# Patient Record
Sex: FEMALE | Race: WHITE | NOT HISPANIC OR LATINO | Employment: OTHER | URBAN - METROPOLITAN AREA
[De-identification: names, ages, dates, MRNs, and addresses within clinical notes are randomized per-mention and may not be internally consistent; named-entity substitution may affect disease eponyms.]

---

## 2018-01-19 LAB — POC BETA-HCG (QUAL): NEGATIVE

## 2018-05-21 ENCOUNTER — HISTORICAL (OUTPATIENT)
Dept: ADMINISTRATIVE | Facility: HOSPITAL | Age: 23
End: 2018-05-21

## 2018-05-21 LAB
ABS NEUT (OLG): 4.53 X10(3)/MCL (ref 2.1–9.2)
ALBUMIN SERPL-MCNC: 3.8 GM/DL (ref 3.4–5)
ALBUMIN/GLOB SERPL: 1.1 {RATIO}
ALP SERPL-CCNC: 106 UNIT/L (ref 38–126)
ALT SERPL-CCNC: 26 UNIT/L (ref 12–78)
AST SERPL-CCNC: 17 UNIT/L (ref 15–37)
BASOPHILS # BLD AUTO: 0 X10(3)/MCL (ref 0–0.2)
BASOPHILS NFR BLD AUTO: 0 %
BILIRUB SERPL-MCNC: 0.2 MG/DL (ref 0.2–1)
BILIRUBIN DIRECT+TOT PNL SERPL-MCNC: 0.1 MG/DL (ref 0–0.2)
BILIRUBIN DIRECT+TOT PNL SERPL-MCNC: 0.1 MG/DL (ref 0–0.8)
BUN SERPL-MCNC: 9 MG/DL (ref 7–18)
CALCIUM SERPL-MCNC: 8.7 MG/DL (ref 8.5–10.1)
CHLORIDE SERPL-SCNC: 104 MMOL/L (ref 98–107)
CO2 SERPL-SCNC: 25 MMOL/L (ref 21–32)
CREAT SERPL-MCNC: 0.6 MG/DL (ref 0.55–1.02)
EOSINOPHIL # BLD AUTO: 0.2 X10(3)/MCL (ref 0–0.9)
EOSINOPHIL NFR BLD AUTO: 2 %
ERYTHROCYTE [DISTWIDTH] IN BLOOD BY AUTOMATED COUNT: 11.9 % (ref 11.5–17)
GLOBULIN SER-MCNC: 3.4 GM/DL (ref 2.4–3.5)
GLUCOSE SERPL-MCNC: 74 MG/DL (ref 74–106)
HCT VFR BLD AUTO: 40.8 % (ref 37–47)
HGB BLD-MCNC: 13.7 GM/DL (ref 12–16)
LYMPHOCYTES # BLD AUTO: 3.7 X10(3)/MCL (ref 0.6–4.6)
LYMPHOCYTES NFR BLD AUTO: 41 %
MCH RBC QN AUTO: 30 PG (ref 27–31)
MCHC RBC AUTO-ENTMCNC: 33.6 GM/DL (ref 33–36)
MCV RBC AUTO: 89.3 FL (ref 80–94)
MONOCYTES # BLD AUTO: 0.6 X10(3)/MCL (ref 0.1–1.3)
MONOCYTES NFR BLD AUTO: 6 %
NEUTROPHILS # BLD AUTO: 4.53 X10(3)/MCL (ref 2.1–9.2)
NEUTROPHILS NFR BLD AUTO: 50 %
PLATELET # BLD AUTO: 237 X10(3)/MCL (ref 130–400)
PMV BLD AUTO: 9.4 FL (ref 9.4–12.4)
POTASSIUM SERPL-SCNC: 3.8 MMOL/L (ref 3.5–5.1)
PROT SERPL-MCNC: 7.2 GM/DL (ref 6.4–8.2)
RBC # BLD AUTO: 4.57 X10(6)/MCL (ref 4.2–5.4)
SODIUM SERPL-SCNC: 140 MMOL/L (ref 136–145)
TSH SERPL-ACNC: 1.29 MIU/L (ref 0.36–3.74)
WBC # SPEC AUTO: 9.1 X10(3)/MCL (ref 4.5–11.5)

## 2018-12-14 ENCOUNTER — HISTORICAL (OUTPATIENT)
Dept: ADMINISTRATIVE | Facility: HOSPITAL | Age: 23
End: 2018-12-14

## 2018-12-14 LAB
ABS NEUT (OLG): 7.44 X10(3)/MCL (ref 2.1–9.2)
ALBUMIN SERPL-MCNC: 3.5 GM/DL (ref 3.4–5)
ALBUMIN/GLOB SERPL: 1.1 {RATIO}
ALP SERPL-CCNC: 91 UNIT/L (ref 38–126)
ALT SERPL-CCNC: 23 UNIT/L (ref 12–78)
AMPHET UR QL SCN: NORMAL
APPEARANCE, UA: CLEAR
AST SERPL-CCNC: 14 UNIT/L (ref 15–37)
B-HCG SERPL QL: NEGATIVE
BACTERIA SPEC CULT: ABNORMAL /HPF
BARBITURATE SCN PRESENT UR: NORMAL
BASOPHILS # BLD AUTO: 0 X10(3)/MCL (ref 0–0.2)
BASOPHILS NFR BLD AUTO: 0 %
BENZODIAZ UR QL SCN: NORMAL
BILIRUB SERPL-MCNC: 0.2 MG/DL (ref 0.2–1)
BILIRUB UR QL STRIP: NEGATIVE
BILIRUBIN DIRECT+TOT PNL SERPL-MCNC: 0.1 MG/DL (ref 0–0.2)
BILIRUBIN DIRECT+TOT PNL SERPL-MCNC: 0.1 MG/DL (ref 0–0.8)
BUN SERPL-MCNC: 9 MG/DL (ref 7–18)
CALCIUM SERPL-MCNC: 8.4 MG/DL (ref 8.5–10.1)
CANNABINOIDS UR QL SCN: NORMAL
CHLORIDE SERPL-SCNC: 110 MMOL/L (ref 98–107)
CO2 SERPL-SCNC: 22 MMOL/L (ref 21–32)
COCAINE UR QL SCN: NORMAL
COLOR UR: YELLOW
CREAT SERPL-MCNC: 0.6 MG/DL (ref 0.55–1.02)
EOSINOPHIL # BLD AUTO: 0.2 X10(3)/MCL (ref 0–0.9)
EOSINOPHIL NFR BLD AUTO: 1 %
EOSINOPHIL NFR BLD MANUAL: 1 % (ref 0–8)
ERYTHROCYTE [DISTWIDTH] IN BLOOD BY AUTOMATED COUNT: 12.2 % (ref 11.5–17)
ETHANOL SERPL-MCNC: <3 MG/DL
GLOBULIN SER-MCNC: 3.2 GM/DL (ref 2.4–3.5)
GLUCOSE (UA): NEGATIVE
GLUCOSE SERPL-MCNC: 99 MG/DL (ref 74–106)
HCT VFR BLD AUTO: 36.9 % (ref 37–47)
HGB BLD-MCNC: 12.3 GM/DL (ref 12–16)
HGB UR QL STRIP: NEGATIVE
HYPOCHROMIA BLD QL SMEAR: 1
KETONES UR QL STRIP: NEGATIVE
LEUKOCYTE ESTERASE UR QL STRIP: ABNORMAL
LYMPHOCYTES # BLD AUTO: 6.1 X10(3)/MCL (ref 0.6–4.6)
LYMPHOCYTES NFR BLD AUTO: 42 %
LYMPHOCYTES NFR BLD MANUAL: 37 % (ref 13–40)
MAGNESIUM SERPL-MCNC: 2 MG/DL (ref 1.8–2.4)
MCH RBC QN AUTO: 29.6 PG (ref 27–31)
MCHC RBC AUTO-ENTMCNC: 33.3 GM/DL (ref 33–36)
MCV RBC AUTO: 88.7 FL (ref 80–94)
MONOCYTES # BLD AUTO: 0.7 X10(3)/MCL (ref 0.1–1.3)
MONOCYTES NFR BLD AUTO: 5 %
MONOCYTES NFR BLD MANUAL: 5 % (ref 2–11)
NEUTROPHILS # BLD AUTO: 7.44 X10(3)/MCL (ref 2.1–9.2)
NEUTROPHILS NFR BLD AUTO: 51 %
NEUTROPHILS NFR BLD MANUAL: 57 % (ref 47–80)
NITRITE UR QL STRIP: NEGATIVE
OPIATES UR QL SCN: NORMAL
PCP UR QL: NORMAL
PH UR STRIP.AUTO: 7 [PH] (ref 5–7.5)
PH UR STRIP: 7 [PH] (ref 5–9)
PLATELET # BLD AUTO: 242 X10(3)/MCL (ref 130–400)
PLATELET # BLD EST: NORMAL 10*3/UL
PMV BLD AUTO: 9.2 FL (ref 9.4–12.4)
POC BETA-HCG (QUAL): NEGATIVE
POLYCHROMASIA BLD QL SMEAR: 1
POTASSIUM SERPL-SCNC: 3.2 MMOL/L (ref 3.5–5.1)
PROT SERPL-MCNC: 6.7 GM/DL (ref 6.4–8.2)
PROT UR QL STRIP: NEGATIVE
RBC # BLD AUTO: 4.16 X10(6)/MCL (ref 4.2–5.4)
RBC #/AREA URNS HPF: ABNORMAL /[HPF]
RBC MORPH BLD: NORMAL
SODIUM SERPL-SCNC: 141 MMOL/L (ref 136–145)
SP GR FLD REFRACTOMETRY: 1.01 (ref 1–1.03)
SP GR UR STRIP: 1.01 (ref 1–1.03)
SQUAMOUS EPITHELIAL, UA: 10 /HPF (ref 0–4)
UROBILINOGEN UR STRIP-ACNC: 0.2
WBC # SPEC AUTO: 14.6 X10(3)/MCL (ref 4.5–11.5)
WBC #/AREA URNS HPF: 6 /HPF (ref 0–3)

## 2018-12-16 LAB — FINAL CULTURE: NO GROWTH

## 2019-05-27 ENCOUNTER — HISTORICAL (OUTPATIENT)
Dept: ADMINISTRATIVE | Facility: HOSPITAL | Age: 24
End: 2019-05-27

## 2019-05-27 LAB
ABS NEUT (OLG): 9.5 X10(3)/MCL (ref 2.1–9.2)
ALBUMIN SERPL-MCNC: 2.9 GM/DL (ref 3.4–5)
ALBUMIN/GLOB SERPL: 0.7 RATIO (ref 1.1–2)
ALP SERPL-CCNC: 88 UNIT/L (ref 38–126)
ALT SERPL-CCNC: 17 UNIT/L (ref 12–78)
APPEARANCE, UA: CLEAR
AST SERPL-CCNC: 14 UNIT/L (ref 15–37)
BACTERIA SPEC CULT: ABNORMAL /HPF
BASOPHILS # BLD AUTO: 0 X10(3)/MCL (ref 0–0.2)
BASOPHILS NFR BLD AUTO: 0 %
BILIRUB SERPL-MCNC: 0.1 MG/DL (ref 0.2–1)
BILIRUB UR QL STRIP: NEGATIVE
BILIRUBIN DIRECT+TOT PNL SERPL-MCNC: 0 MG/DL (ref 0–0.8)
BILIRUBIN DIRECT+TOT PNL SERPL-MCNC: 0.1 MG/DL (ref 0–0.5)
BUN SERPL-MCNC: 6 MG/DL (ref 7–18)
CALCIUM SERPL-MCNC: 8.9 MG/DL (ref 8.5–10.1)
CHLORIDE SERPL-SCNC: 113 MMOL/L (ref 98–107)
CO2 SERPL-SCNC: 18 MMOL/L (ref 21–32)
COLOR UR: YELLOW
CREAT SERPL-MCNC: 0.56 MG/DL (ref 0.55–1.02)
EOSINOPHIL # BLD AUTO: 0.1 X10(3)/MCL (ref 0–0.9)
EOSINOPHIL NFR BLD AUTO: 1 %
ERYTHROCYTE [DISTWIDTH] IN BLOOD BY AUTOMATED COUNT: 12.7 % (ref 11.5–17)
GLOBULIN SER-MCNC: 4 GM/DL (ref 2.4–3.5)
GLUCOSE (UA): ABNORMAL
GLUCOSE SERPL-MCNC: 118 MG/DL (ref 74–106)
HCT VFR BLD AUTO: 34.6 % (ref 37–47)
HGB BLD-MCNC: 11.5 GM/DL (ref 12–16)
HGB UR QL STRIP: NEGATIVE
KETONES UR QL STRIP: ABNORMAL
LEUKOCYTE ESTERASE UR QL STRIP: ABNORMAL
LYMPHOCYTES # BLD AUTO: 3.4 X10(3)/MCL (ref 0.6–4.6)
LYMPHOCYTES NFR BLD AUTO: 25 %
MCH RBC QN AUTO: 30.7 PG (ref 27–31)
MCHC RBC AUTO-ENTMCNC: 33.2 GM/DL (ref 33–36)
MCV RBC AUTO: 92.3 FL (ref 80–94)
MONOCYTES # BLD AUTO: 0.8 X10(3)/MCL (ref 0.1–1.3)
MONOCYTES NFR BLD AUTO: 6 %
NEUTROPHILS # BLD AUTO: 9.5 X10(3)/MCL (ref 2.1–9.2)
NEUTROPHILS NFR BLD AUTO: 68 %
NITRITE UR QL STRIP: NEGATIVE
PH UR STRIP: 6 [PH] (ref 5–9)
PLATELET # BLD AUTO: 224 X10(3)/MCL (ref 130–400)
PMV BLD AUTO: 9.4 FL (ref 9.4–12.4)
POTASSIUM SERPL-SCNC: 3.4 MMOL/L (ref 3.5–5.1)
PROT SERPL-MCNC: 6.9 GM/DL (ref 6.4–8.2)
PROT UR QL STRIP: NEGATIVE
RBC # BLD AUTO: 3.75 X10(6)/MCL (ref 4.2–5.4)
RBC #/AREA URNS HPF: ABNORMAL /[HPF]
SODIUM SERPL-SCNC: 140 MMOL/L (ref 136–145)
SP GR UR STRIP: 1.02 (ref 1–1.03)
SQUAMOUS EPITHELIAL, UA: 7 /HPF (ref 0–4)
UROBILINOGEN UR STRIP-ACNC: 0.2
WBC # SPEC AUTO: 13.9 X10(3)/MCL (ref 4.5–11.5)
WBC #/AREA URNS HPF: 7 /HPF (ref 0–3)

## 2019-05-30 LAB — FINAL CULTURE: NORMAL

## 2020-01-03 ENCOUNTER — HISTORICAL (OUTPATIENT)
Dept: ADMINISTRATIVE | Facility: HOSPITAL | Age: 25
End: 2020-01-03

## 2020-01-03 LAB
ABS NEUT (OLG): 8.35 X10(3)/MCL (ref 2.1–9.2)
ALBUMIN SERPL-MCNC: 3.9 GM/DL (ref 3.4–5)
ALBUMIN/GLOB SERPL: 1.1 RATIO (ref 1.1–2)
ALP SERPL-CCNC: 86 UNIT/L (ref 45–117)
ALT SERPL-CCNC: 35 UNIT/L (ref 12–78)
AMYLASE SERPL-CCNC: 21 UNIT/L (ref 25–115)
APPEARANCE, UA: ABNORMAL
AST SERPL-CCNC: 15 UNIT/L (ref 15–37)
BACTERIA #/AREA URNS AUTO: ABNORMAL /HPF
BASOPHILS # BLD AUTO: 0 X10(3)/MCL (ref 0–0.2)
BASOPHILS NFR BLD AUTO: 0 %
BILIRUB SERPL-MCNC: 0.2 MG/DL (ref 0.2–1)
BILIRUB UR QL STRIP: 0.5 MG/DL
BILIRUBIN DIRECT+TOT PNL SERPL-MCNC: <0.1 MG/DL (ref 0–0.2)
BILIRUBIN DIRECT+TOT PNL SERPL-MCNC: ABNORMAL MG/DL
BUN SERPL-MCNC: 11 MG/DL (ref 7–18)
CALCIUM SERPL-MCNC: 9.2 MG/DL (ref 8.5–10.1)
CHLORIDE SERPL-SCNC: 113 MMOL/L (ref 98–107)
CO2 SERPL-SCNC: 18 MMOL/L (ref 21–32)
COLOR UR: YELLOW
CREAT SERPL-MCNC: 0.9 MG/DL (ref 0.6–1.3)
EOSINOPHIL # BLD AUTO: 0.4 X10(3)/MCL (ref 0–0.9)
EOSINOPHIL NFR BLD AUTO: 3 %
ERYTHROCYTE [DISTWIDTH] IN BLOOD BY AUTOMATED COUNT: 11.9 % (ref 11.5–14.5)
GLOBULIN SER-MCNC: 3.7 GM/ML (ref 2.3–3.5)
GLUCOSE (UA): NEGATIVE
GLUCOSE SERPL-MCNC: 101 MG/DL (ref 74–106)
HCT VFR BLD AUTO: 39.9 % (ref 35–46)
HGB BLD-MCNC: 13.7 GM/DL (ref 12–16)
HGB UR QL STRIP: 0.2
HYALINE CASTS #/AREA URNS LPF: ABNORMAL /LPF
IMM GRANULOCYTES # BLD AUTO: 0.03 10*3/UL
IMM GRANULOCYTES NFR BLD AUTO: 0 %
KETONES UR QL STRIP: ABNORMAL
LEUKOCYTE ESTERASE UR QL STRIP: 25 LEU/UL
LIPASE SERPL-CCNC: 85 UNIT/L (ref 73–393)
LYMPHOCYTES # BLD AUTO: 3.4 X10(3)/MCL (ref 0.6–4.6)
LYMPHOCYTES NFR BLD AUTO: 26 %
MCH RBC QN AUTO: 29.5 PG (ref 26–34)
MCHC RBC AUTO-ENTMCNC: 34.3 GM/DL (ref 31–37)
MCV RBC AUTO: 85.8 FL (ref 80–100)
MONOCYTES # BLD AUTO: 1 X10(3)/MCL (ref 0.1–1.3)
MONOCYTES NFR BLD AUTO: 8 %
NEUTROPHILS # BLD AUTO: 8.35 X10(3)/MCL (ref 2.1–9.2)
NEUTROPHILS NFR BLD AUTO: 64 %
NITRITE UR QL STRIP: NEGATIVE
PH UR STRIP: 6 [PH] (ref 4.5–8)
PLATELET # BLD AUTO: 259 X10(3)/MCL (ref 130–400)
PMV BLD AUTO: 9.4 FL (ref 7.4–10.4)
POC BETA-HCG (QUAL): NEGATIVE
POTASSIUM SERPL-SCNC: 3.4 MMOL/L (ref 3.5–5.1)
PROT SERPL-MCNC: 7.6 GM/DL (ref 6.4–8.2)
PROT UR QL STRIP: 30 MG/DL
RBC # BLD AUTO: 4.65 X10(6)/MCL (ref 4–5.2)
RBC #/AREA URNS AUTO: ABNORMAL /HPF
SODIUM SERPL-SCNC: 140 MMOL/L (ref 136–145)
SP GR UR STRIP: 1.03 (ref 1–1.03)
SQUAMOUS #/AREA URNS LPF: >100 /LPF
UROBILINOGEN UR STRIP-ACNC: 3 MG/DL
WBC # SPEC AUTO: 13.2 X10(3)/MCL (ref 4.5–11)
WBC #/AREA URNS AUTO: ABNORMAL /HPF

## 2020-01-05 LAB — FINAL CULTURE: NORMAL

## 2020-06-22 ENCOUNTER — HISTORICAL (OUTPATIENT)
Dept: ADMINISTRATIVE | Facility: HOSPITAL | Age: 25
End: 2020-06-22

## 2020-06-23 LAB
ABS NEUT (OLG): 7.25 X10(3)/MCL (ref 2.1–9.2)
ALBUMIN SERPL-MCNC: 4.2 GM/DL (ref 3.5–5)
ALBUMIN/GLOB SERPL: 1 RATIO (ref 1.1–2)
ALP SERPL-CCNC: 101 UNIT/L (ref 40–150)
ALT SERPL-CCNC: 22 UNIT/L (ref 0–55)
APPEARANCE, UA: ABNORMAL
AST SERPL-CCNC: 23 UNIT/L (ref 5–34)
B-HCG SERPL QL: NEGATIVE
BACTERIA SPEC CULT: ABNORMAL /HPF
BASOPHILS # BLD AUTO: 0 X10(3)/MCL (ref 0–0.2)
BASOPHILS NFR BLD AUTO: 0 %
BILIRUB SERPL-MCNC: 0.3 MG/DL
BILIRUB UR QL STRIP: NEGATIVE
BILIRUBIN DIRECT+TOT PNL SERPL-MCNC: 0.1 MG/DL (ref 0–0.5)
BILIRUBIN DIRECT+TOT PNL SERPL-MCNC: 0.2 MG/DL (ref 0–0.8)
BUN SERPL-MCNC: 10.3 MG/DL (ref 7–18.7)
CALCIUM SERPL-MCNC: 9.6 MG/DL (ref 8.4–10.2)
CHLORIDE SERPL-SCNC: 106 MMOL/L (ref 98–107)
CO2 SERPL-SCNC: 22 MMOL/L (ref 22–29)
COLOR UR: YELLOW
CREAT SERPL-MCNC: 0.84 MG/DL (ref 0.55–1.02)
EOSINOPHIL # BLD AUTO: 0.2 X10(3)/MCL (ref 0–0.9)
EOSINOPHIL NFR BLD AUTO: 1 %
ERYTHROCYTE [DISTWIDTH] IN BLOOD BY AUTOMATED COUNT: 12.6 % (ref 11.5–17)
GLOBULIN SER-MCNC: 4.4 GM/DL (ref 2.4–3.5)
GLUCOSE (UA): NEGATIVE
GLUCOSE SERPL-MCNC: 104 MG/DL (ref 74–100)
HCT VFR BLD AUTO: 47.3 % (ref 37–47)
HGB BLD-MCNC: 15.5 GM/DL (ref 12–16)
HGB UR QL STRIP: NEGATIVE
IMM GRANULOCYTES # BLD AUTO: 0 10*3/UL
IMM GRANULOCYTES NFR BLD AUTO: 0 %
KETONES UR QL STRIP: ABNORMAL
LEUKOCYTE ESTERASE UR QL STRIP: NEGATIVE
LYMPHOCYTES # BLD AUTO: 5.3 X10(3)/MCL (ref 0.6–4.6)
LYMPHOCYTES NFR BLD AUTO: 40 %
MCH RBC QN AUTO: 28.4 PG (ref 27–31)
MCHC RBC AUTO-ENTMCNC: 32.8 GM/DL (ref 33–36)
MCV RBC AUTO: 86.6 FL (ref 80–94)
MONOCYTES # BLD AUTO: 0.5 X10(3)/MCL (ref 0.1–1.3)
MONOCYTES NFR BLD AUTO: 4 %
NEUTROPHILS # BLD AUTO: 7.25 X10(3)/MCL (ref 2.1–9.2)
NEUTROPHILS NFR BLD AUTO: 55 %
NITRITE UR QL STRIP: NEGATIVE
PH UR STRIP: 5.5 [PH] (ref 5–9)
PLATELET # BLD AUTO: 324 X10(3)/MCL (ref 130–400)
PMV BLD AUTO: 9 FL (ref 9.4–12.4)
POTASSIUM SERPL-SCNC: 3.5 MMOL/L (ref 3.5–5.1)
PROT SERPL-MCNC: 8.6 GM/DL (ref 6.4–8.3)
PROT UR QL STRIP: NEGATIVE
RBC # BLD AUTO: 5.46 X10(6)/MCL (ref 4.2–5.4)
RBC #/AREA URNS HPF: ABNORMAL /[HPF]
SODIUM SERPL-SCNC: 141 MMOL/L (ref 136–145)
SP GR UR STRIP: 1.03 (ref 1–1.03)
SQUAMOUS EPITHELIAL, UA: 9 /HPF (ref 0–4)
UROBILINOGEN UR STRIP-ACNC: 0.2
WBC # SPEC AUTO: 13.3 X10(3)/MCL (ref 4.5–11.5)
WBC #/AREA URNS HPF: 8 /HPF (ref 0–3)

## 2020-06-25 LAB — FINAL CULTURE: NORMAL

## 2021-01-26 ENCOUNTER — HISTORICAL (OUTPATIENT)
Dept: ADMINISTRATIVE | Facility: HOSPITAL | Age: 26
End: 2021-01-26

## 2021-01-26 LAB — SARS-COV-2 RNA RESP QL NAA+PROBE: POSITIVE

## 2021-01-28 LAB — FINAL CULTURE: NORMAL

## 2021-06-16 ENCOUNTER — HISTORICAL (OUTPATIENT)
Dept: ADMINISTRATIVE | Facility: HOSPITAL | Age: 26
End: 2021-06-16

## 2021-06-16 LAB
ABS NEUT (OLG): 4.25 X10(3)/MCL (ref 2.1–9.2)
BASOPHILS # BLD AUTO: 0 X10(3)/MCL (ref 0–0.2)
BASOPHILS NFR BLD AUTO: 0 %
EOSINOPHIL # BLD AUTO: 0.2 X10(3)/MCL (ref 0–0.9)
EOSINOPHIL NFR BLD AUTO: 2 %
ERYTHROCYTE [DISTWIDTH] IN BLOOD BY AUTOMATED COUNT: 13.2 % (ref 11.5–17)
GROUP & RH: NORMAL
HCT VFR BLD AUTO: 43.3 % (ref 37–47)
HGB BLD-MCNC: 14.4 GM/DL (ref 12–16)
LYMPHOCYTES # BLD AUTO: 3.6 X10(3)/MCL (ref 0.6–4.6)
LYMPHOCYTES NFR BLD AUTO: 42 %
MCH RBC QN AUTO: 28.6 PG (ref 27–31)
MCHC RBC AUTO-ENTMCNC: 33.3 GM/DL (ref 33–36)
MCV RBC AUTO: 85.9 FL (ref 80–94)
MONOCYTES # BLD AUTO: 0.5 X10(3)/MCL (ref 0.1–1.3)
MONOCYTES NFR BLD AUTO: 6 %
NEUTROPHILS # BLD AUTO: 4.25 X10(3)/MCL (ref 2.1–9.2)
NEUTROPHILS NFR BLD AUTO: 50 %
PLATELET # BLD AUTO: 256 X10(3)/MCL (ref 130–400)
PMV BLD AUTO: 9.6 FL (ref 9.4–12.4)
RBC # BLD AUTO: 5.04 X10(6)/MCL (ref 4.2–5.4)
WBC # SPEC AUTO: 8.6 X10(3)/MCL (ref 4.5–11.5)

## 2022-09-18 ENCOUNTER — HISTORICAL (OUTPATIENT)
Dept: ADMINISTRATIVE | Facility: HOSPITAL | Age: 27
End: 2022-09-18

## 2022-10-30 ENCOUNTER — HOSPITAL ENCOUNTER (EMERGENCY)
Facility: HOSPITAL | Age: 27
Discharge: HOME | End: 2022-10-31
Attending: INTERNAL MEDICINE
Payer: MEDICAID

## 2022-10-30 DIAGNOSIS — R55 SYNCOPE, UNSPECIFIED SYNCOPE TYPE: Primary | ICD-10-CM

## 2022-10-30 LAB
ALBUMIN SERPL-MCNC: 3.6 GM/DL (ref 3.5–5)
ALBUMIN/GLOB SERPL: 1.2 RATIO (ref 1.1–2)
ALP SERPL-CCNC: 86 UNIT/L (ref 40–150)
ALT SERPL-CCNC: 11 UNIT/L (ref 0–55)
AST SERPL-CCNC: 12 UNIT/L (ref 5–34)
B-HCG UR QL: NEGATIVE
BASOPHILS # BLD AUTO: 0.01 X10(3)/MCL (ref 0–0.2)
BASOPHILS NFR BLD AUTO: 0.1 %
BILIRUBIN DIRECT+TOT PNL SERPL-MCNC: 0.2 MG/DL
BUN SERPL-MCNC: 13.5 MG/DL (ref 7–18.7)
CALCIUM SERPL-MCNC: 8.9 MG/DL (ref 8.4–10.2)
CHLORIDE SERPL-SCNC: 106 MMOL/L (ref 98–107)
CO2 SERPL-SCNC: 23 MMOL/L (ref 22–29)
CREAT SERPL-MCNC: 0.76 MG/DL (ref 0.55–1.02)
CTP QC/QA: YES
EOSINOPHIL # BLD AUTO: 0.04 X10(3)/MCL (ref 0–0.9)
EOSINOPHIL NFR BLD AUTO: 0.3 %
ERYTHROCYTE [DISTWIDTH] IN BLOOD BY AUTOMATED COUNT: 12.5 % (ref 11.5–17)
GFR SERPLBLD CREATININE-BSD FMLA CKD-EPI: >60 MLS/MIN/1.73/M2
GLOBULIN SER-MCNC: 2.9 GM/DL (ref 2.4–3.5)
GLUCOSE SERPL-MCNC: 108 MG/DL (ref 74–100)
HCT VFR BLD AUTO: 38.8 % (ref 37–47)
HGB BLD-MCNC: 12.5 GM/DL (ref 12–16)
IMM GRANULOCYTES # BLD AUTO: 0.04 X10(3)/MCL (ref 0–0.04)
IMM GRANULOCYTES NFR BLD AUTO: 0.3 %
LYMPHOCYTES # BLD AUTO: 4.12 X10(3)/MCL (ref 0.6–4.6)
LYMPHOCYTES NFR BLD AUTO: 35.9 %
MAGNESIUM SERPL-MCNC: 2.2 MG/DL (ref 1.6–2.6)
MCH RBC QN AUTO: 28.4 PG (ref 27–31)
MCHC RBC AUTO-ENTMCNC: 32.2 MG/DL (ref 33–36)
MCV RBC AUTO: 88.2 FL (ref 80–94)
MONOCYTES # BLD AUTO: 0.66 X10(3)/MCL (ref 0.1–1.3)
MONOCYTES NFR BLD AUTO: 5.7 %
NEUTROPHILS # BLD AUTO: 6.6 X10(3)/MCL (ref 2.1–9.2)
NEUTROPHILS NFR BLD AUTO: 57.7 %
NRBC BLD AUTO-RTO: 0 %
PLATELET # BLD AUTO: 258 X10(3)/MCL (ref 130–400)
PMV BLD AUTO: 9.2 FL (ref 7.4–10.4)
POCT GLUCOSE: 105 MG/DL (ref 70–110)
POTASSIUM SERPL-SCNC: 3.6 MMOL/L (ref 3.5–5.1)
PROT SERPL-MCNC: 6.5 GM/DL (ref 6.4–8.3)
RBC # BLD AUTO: 4.4 X10(6)/MCL (ref 4.2–5.4)
SODIUM SERPL-SCNC: 141 MMOL/L (ref 136–145)
WBC # SPEC AUTO: 11.5 X10(3)/MCL (ref 4.5–11.5)

## 2022-10-30 PROCEDURE — 81025 URINE PREGNANCY TEST: CPT | Performed by: INTERNAL MEDICINE

## 2022-10-30 PROCEDURE — 99285 EMERGENCY DEPT VISIT HI MDM: CPT | Mod: 25

## 2022-10-30 PROCEDURE — 85025 COMPLETE CBC W/AUTO DIFF WBC: CPT | Performed by: INTERNAL MEDICINE

## 2022-10-30 PROCEDURE — 82962 GLUCOSE BLOOD TEST: CPT

## 2022-10-30 PROCEDURE — 80053 COMPREHEN METABOLIC PANEL: CPT | Performed by: INTERNAL MEDICINE

## 2022-10-30 PROCEDURE — 83735 ASSAY OF MAGNESIUM: CPT | Performed by: INTERNAL MEDICINE

## 2022-10-30 PROCEDURE — 93005 ELECTROCARDIOGRAM TRACING: CPT

## 2022-10-30 PROCEDURE — 36415 COLL VENOUS BLD VENIPUNCTURE: CPT | Performed by: INTERNAL MEDICINE

## 2022-10-30 RX ORDER — VALACYCLOVIR HYDROCHLORIDE 500 MG/1
500 TABLET, FILM COATED ORAL 2 TIMES DAILY
COMMUNITY
Start: 2022-10-06

## 2022-10-30 RX ORDER — VENLAFAXINE HYDROCHLORIDE 37.5 MG/1
37.5 CAPSULE, EXTENDED RELEASE ORAL DAILY
Status: ON HOLD | COMMUNITY
Start: 2022-10-23 | End: 2024-03-04 | Stop reason: HOSPADM

## 2022-10-30 RX ORDER — TOPIRAMATE 50 MG/1
50 TABLET, FILM COATED ORAL 2 TIMES DAILY
COMMUNITY
Start: 2022-10-03

## 2022-10-30 RX ORDER — TIZANIDINE 2 MG/1
1 TABLET ORAL NIGHTLY
COMMUNITY
Start: 2022-10-25 | End: 2023-01-13 | Stop reason: ALTCHOICE

## 2022-10-30 RX ORDER — RISPERIDONE 1 MG/1
1 TABLET ORAL 2 TIMES DAILY
Status: ON HOLD | COMMUNITY
Start: 2022-10-23 | End: 2024-03-04 | Stop reason: HOSPADM

## 2022-10-30 NOTE — Clinical Note
"Julia Kaurie" Parrish was seen and treated in our emergency department on 10/30/2022.  She may return to school on 11/01/2022.      If you have any questions or concerns, please don't hesitate to call.      Pee Castaneda MD"

## 2022-10-31 VITALS
RESPIRATION RATE: 18 BRPM | HEART RATE: 93 BPM | WEIGHT: 219.44 LBS | HEIGHT: 67 IN | DIASTOLIC BLOOD PRESSURE: 76 MMHG | BODY MASS INDEX: 34.44 KG/M2 | TEMPERATURE: 98 F | OXYGEN SATURATION: 99 % | SYSTOLIC BLOOD PRESSURE: 121 MMHG

## 2022-10-31 NOTE — ED PROVIDER NOTES
"Encounter Date: 10/30/2022       History     Chief Complaint   Patient presents with    Loss of Consciousness     Pt reports "blacking out" with out loc then going home and fainting "for a few seconds" did witnessed by , no head injury, was caught by , varinder.  in triage.      Presents with an episode of syncope at home today. States she was participating in an activity today when she realized she was not able to remember what she did for some minutes but did not loss consciousness or fell, her  did not noticed any abnormality during the activity. Then, when she went home she felt dizzy and pass out.  states she told him she was not feeling well and fainted, no injuries. States Hx of POTS and bipolar disorder.    The history is provided by the patient.   Review of patient's allergies indicates:   Allergen Reactions    Lamotrigine Hives    Amoxicillin Hives    Clindamycin Hives    Hydroxyzine Hallucinations    Ibuprofen Other (See Comments)    Norelgestromin-ethin.estradiol Hives    Penicillin Hives    Pseudoephedrine hcl Hives    Sulfamethoxazole-trimethoprim      History reviewed. No pertinent past medical history.  History reviewed. No pertinent surgical history.  History reviewed. No pertinent family history.  Social History     Tobacco Use    Smoking status: Never    Smokeless tobacco: Never   Substance Use Topics    Drug use: Not Currently     Review of Systems   Constitutional:  Negative for fever.   HENT:  Negative for sore throat.    Respiratory:  Negative for shortness of breath.    Cardiovascular:  Negative for chest pain.   Gastrointestinal:  Negative for nausea.   Genitourinary:  Negative for dysuria.   Musculoskeletal:  Negative for back pain.   Skin:  Negative for rash.   Neurological:  Positive for syncope and light-headedness. Negative for weakness.   Hematological:  Does not bruise/bleed easily.   All other systems reviewed and are negative.    Physical Exam "     Initial Vitals [10/30/22 2116]   BP Pulse Resp Temp SpO2   123/80 96 20 98.1 °F (36.7 °C) 99 %      MAP       --         Physical Exam    Nursing note and vitals reviewed.  Constitutional: She appears well-developed.   HENT:   Head: Normocephalic and atraumatic.   Mouth/Throat: Oropharynx is clear and moist.   Eyes: Conjunctivae and EOM are normal. Pupils are equal, round, and reactive to light.   Neck: Neck supple.   Normal range of motion.  Cardiovascular:  Normal rate, regular rhythm, normal heart sounds and intact distal pulses.           Pulmonary/Chest: Breath sounds normal.   Abdominal: Abdomen is soft. Bowel sounds are normal. She exhibits no distension. There is no abdominal tenderness. There is no rebound and no guarding.   Musculoskeletal:         General: No edema. Normal range of motion.      Cervical back: Normal range of motion and neck supple.     Neurological: She is alert and oriented to person, place, and time. She has normal strength. No cranial nerve deficit. GCS score is 15. GCS eye subscore is 4. GCS verbal subscore is 5. GCS motor subscore is 6.   Normal coordination, finger to nose normal   Skin: Skin is warm and dry. No rash noted.   Psychiatric: Her behavior is normal.       ED Course   Procedures  Labs Reviewed   COMPREHENSIVE METABOLIC PANEL - Abnormal; Notable for the following components:       Result Value    Glucose Level 108 (*)     All other components within normal limits   CBC WITH DIFFERENTIAL - Abnormal; Notable for the following components:    MCHC 32.2 (*)     All other components within normal limits   MAGNESIUM - Normal   CBC W/ AUTO DIFFERENTIAL    Narrative:     The following orders were created for panel order CBC auto differential.  Procedure                               Abnormality         Status                     ---------                               -----------         ------                     CBC with Differential[[id number]]        Abnormal             Final result                 Please view results for these tests on the individual orders.   EXTRA TUBES    Narrative:     The following orders were created for panel order EXTRA TUBES.  Procedure                               Abnormality         Status                     ---------                               -----------         ------                     Light Blue Top Hold[[id number]]                              In process                 Gold Top Hold[[id number]]                                    In process                   Please view results for these tests on the individual orders.   LIGHT BLUE TOP HOLD   GOLD TOP HOLD   POCT URINE PREGNANCY   POCT GLUCOSE     EKG Readings: (Independently Interpreted)   Initial Reading: No STEMI. Rhythm: Normal Sinus Rhythm. Heart Rate: 80. Ectopy: No Ectopy. Conduction: Normal. ST Segments: Normal ST Segments. T Waves: Normal. Axis: Normal. Clinical Impression: Normal Sinus Rhythm     Imaging Results              CT Head Without Contrast (Preliminary result)  Result time 10/30/22 23:39:11      Preliminary result by Kevon Wong MD (10/30/22 23:39:11)                   Narrative:    START OF REPORT:  Technique: CT of the head was performed without intravenous contrast with axial as well as coronal and sagittal images.    Comparison: None.    Dosage Information: Automated exposure control was utilized.    Clinical history: None.    Findings:  Hemorrhage: No acute intracranial hemorrhage is seen.  CSF spaces: The ventricles sulci and basal cisterns are within normal limits.  Brain parenchyma: Unremarkable with preservation of the grey white junction throughout.  Cerebellum: Unremarkable.  Sella and skull base: The sella appears to be within normal limits for age.  Intracranial calcifications: Incidental note is made of bilateral choroid plexus calcification. Incidental note is made of some pineal region calcification.  Calvarium: No acute linear or depressed skull  fracture is seen.    Maxillofacial Structures:  Paranasal sinuses: The visualized paranasal sinuses appear clear with no significant mucoperiosteal thickening or air fluid levels identified.  Orbits: The orbits appear unremarkable.  Zygomatic arches: The zygomatic arches are intact and unremarkable.  Temporal bones and mastoids: The temporal bones and mastoids appear unremarkable.  TMJ: The mandibular condyles appear normally placed with respect to the mandibular fossa.      Impression:  1. Unremarkable noncontrast CT of the head. Details as above.                          Preliminary result by Interface, Rad Results In (10/30/22 23:39:11)                   Narrative:    START OF REPORT:  Technique: CT of the head was performed without intravenous contrast with axial as well as coronal and sagittal images.    Comparison: None.    Dosage Information: Automated exposure control was utilized.    Clinical history: None.    Findings:  Hemorrhage: No acute intracranial hemorrhage is seen.  CSF spaces: The ventricles sulci and basal cisterns are within normal limits.  Brain parenchyma: Unremarkable with preservation of the grey white junction throughout.  Cerebellum: Unremarkable.  Sella and skull base: The sella appears to be within normal limits for age.  Intracranial calcifications: Incidental note is made of bilateral choroid plexus calcification. Incidental note is made of some pineal region calcification.  Calvarium: No acute linear or depressed skull fracture is seen.    Maxillofacial Structures:  Paranasal sinuses: The visualized paranasal sinuses appear clear with no significant mucoperiosteal thickening or air fluid levels identified.  Orbits: The orbits appear unremarkable.  Zygomatic arches: The zygomatic arches are intact and unremarkable.  Temporal bones and mastoids: The temporal bones and mastoids appear unremarkable.  TMJ: The mandibular condyles appear normally placed with respect to the mandibular  fossa.      Impression:  1. Unremarkable noncontrast CT of the head. Details as above.                                         Medications - No data to display  Medical Decision Making:   Clinical Tests:   Lab Tests: Ordered and Reviewed  Radiological Study: Ordered and Reviewed  ED Management:  Assumed care of this patient upon conclusion of Dr. Wheeler's shift.  Pending CT results was instructed if unremarkable can be discharged stable.  Patient in department has been stable.  No syncopal episodes.  Vitals stable.  Upon examination patient was resting comfortably with significant other bedside.  Reviewed all labs and imaging with patient. has PCP.  Will follow-up closely with them.  Provided very strict return precautions.  Provided work note.  Voiced understanding and ultimately discharged stable. (Leonel)                         Clinical Impression:   Final diagnoses:  [R55] Syncope, unspecified syncope type (Primary)        ED Disposition Condition    Discharge Stable          ED Prescriptions    None       Follow-up Information       Follow up With Specialties Details Why Contact Info    [facility] - Emergency Dept Emergency Medicine  As needed, If symptoms worsen [address] [address] [phone]    PCP  Schedule an appointment as soon as possible for a visit in 3 days               Pee Castaneda MD  10/31/22 0204

## 2023-01-13 ENCOUNTER — HOSPITAL ENCOUNTER (EMERGENCY)
Facility: HOSPITAL | Age: 28
Discharge: HOME | End: 2023-01-13
Attending: EMERGENCY MEDICINE
Payer: MEDICAID

## 2023-01-13 VITALS
SYSTOLIC BLOOD PRESSURE: 105 MMHG | BODY MASS INDEX: 34.37 KG/M2 | TEMPERATURE: 99 F | HEIGHT: 67 IN | DIASTOLIC BLOOD PRESSURE: 70 MMHG | HEART RATE: 78 BPM | RESPIRATION RATE: 17 BRPM | OXYGEN SATURATION: 98 %

## 2023-01-13 DIAGNOSIS — R30.0 DYSURIA: ICD-10-CM

## 2023-01-13 DIAGNOSIS — M54.9 BACK PAIN, UNSPECIFIED BACK LOCATION, UNSPECIFIED BACK PAIN LATERALITY, UNSPECIFIED CHRONICITY: Primary | ICD-10-CM

## 2023-01-13 LAB
APPEARANCE UR: CLEAR
B-HCG UR QL: NEGATIVE
BACTERIA #/AREA URNS AUTO: ABNORMAL /HPF
BILIRUB UR QL STRIP.AUTO: NEGATIVE MG/DL
COLOR UR AUTO: ABNORMAL
CTP QC/QA: YES
GLUCOSE UR QL STRIP.AUTO: NORMAL MG/DL
HYALINE CASTS #/AREA URNS LPF: ABNORMAL /LPF
KETONES UR QL STRIP.AUTO: NEGATIVE MG/DL
LEUKOCYTE ESTERASE UR QL STRIP.AUTO: NEGATIVE UNIT/L
MUCOUS THREADS URNS QL MICRO: ABNORMAL /LPF
NITRITE UR QL STRIP.AUTO: NEGATIVE
PH UR STRIP.AUTO: 6.5 [PH]
PROT UR QL STRIP.AUTO: NEGATIVE MG/DL
RBC #/AREA URNS AUTO: ABNORMAL /HPF
RBC UR QL AUTO: NEGATIVE UNIT/L
SP GR UR STRIP.AUTO: 1.01
SQUAMOUS #/AREA URNS LPF: ABNORMAL /HPF
UROBILINOGEN UR STRIP-ACNC: NORMAL MG/DL
WBC #/AREA URNS AUTO: ABNORMAL /HPF

## 2023-01-13 PROCEDURE — 81001 URINALYSIS AUTO W/SCOPE: CPT | Performed by: PHYSICIAN ASSISTANT

## 2023-01-13 PROCEDURE — 81025 URINE PREGNANCY TEST: CPT | Performed by: PHYSICIAN ASSISTANT

## 2023-01-13 PROCEDURE — 99284 EMERGENCY DEPT VISIT MOD MDM: CPT

## 2023-01-13 RX ORDER — NAPROXEN 500 MG/1
500 TABLET ORAL 2 TIMES DAILY WITH MEALS
Qty: 20 TABLET | Refills: 0 | Status: ON HOLD | OUTPATIENT
Start: 2023-01-13 | End: 2024-03-04 | Stop reason: HOSPADM

## 2023-01-13 RX ORDER — BACLOFEN 10 MG/1
10 TABLET ORAL 3 TIMES DAILY
Qty: 30 TABLET | Refills: 0 | Status: ON HOLD | OUTPATIENT
Start: 2023-01-13 | End: 2024-03-04 | Stop reason: HOSPADM

## 2023-01-14 NOTE — ED PROVIDER NOTES
Encounter Date: 1/13/2023       History     Chief Complaint   Patient presents with    Flank Pain     Pt c/o bilat flank pain x 1 week. Dysuria with hematuria     26 YO WF in ER with complaints of dark orange urine with dysuria and flank pain X 1 week. Denies fever, chills, chest pain, SOB, abdominal pain, N/V/D, HA or dizziness. No other complaints.     The history is provided by the patient.   Review of patient's allergies indicates:   Allergen Reactions    Lamotrigine Hives    Amoxicillin Hives    Clindamycin Hives    Hydroxyzine Hallucinations    Ibuprofen Other (See Comments)    Norelgestromin-ethin.estradiol Hives    Penicillin Hives    Pseudoephedrine hcl Hives    Sulfamethoxazole-trimethoprim      History reviewed. No pertinent past medical history.  History reviewed. No pertinent surgical history.  History reviewed. No pertinent family history.  Social History     Tobacco Use    Smoking status: Never    Smokeless tobacco: Never   Substance Use Topics    Alcohol use: Yes    Drug use: Not Currently     Review of Systems   Constitutional:  Negative for chills and fever.   HENT:  Negative for congestion and sore throat.    Respiratory:  Negative for shortness of breath.    Cardiovascular:  Negative for chest pain.   Gastrointestinal:  Negative for abdominal pain, diarrhea, nausea and vomiting.   Genitourinary:  Positive for dysuria and hematuria.   Musculoskeletal:  Positive for back pain.   Skin:  Negative for rash.   Neurological:  Negative for dizziness, weakness, light-headedness and headaches.   Hematological:  Does not bruise/bleed easily.   All other systems reviewed and are negative.    Physical Exam     Initial Vitals [01/13/23 2104]   BP Pulse Resp Temp SpO2   105/70 78 17 98.6 °F (37 °C) 98 %      MAP       --         Physical Exam    Nursing note and vitals reviewed.  Constitutional: She appears well-developed and well-nourished. She is not diaphoretic. No distress.   HENT:   Head: Normocephalic and  atraumatic.   Eyes: Conjunctivae are normal.   Cardiovascular:  Normal rate, regular rhythm, normal heart sounds and intact distal pulses.           Pulmonary/Chest: Breath sounds normal.   Abdominal: Abdomen is soft.   No right CVA tenderness.  No left CVA tenderness.   Musculoskeletal:         General: Normal range of motion.        Back:      Neurological: She is alert and oriented to person, place, and time. She has normal strength.   Skin: Skin is warm and dry.   Psychiatric: She has a normal mood and affect.       ED Course   Procedures  Labs Reviewed   URINALYSIS, REFLEX TO URINE CULTURE - Abnormal; Notable for the following components:       Result Value    Bacteria, UA Trace (*)     Squamous Epithelial Cells, UA Occ (*)     Mucous, UA Occ (*)     All other components within normal limits   POCT URINE PREGNANCY          Imaging Results    None          Medications - No data to display              ED Course as of 01/13/23 2244 Fri Jan 13, 2023 2244 VSS, NAD, pt is non-toxic or ill appearing, labs  reviewed with pt, treatment plan and discharge instructions including follow up discussed, pt verbalized understanding, all questions answered, pt is stable and ready for discharge  [TT]      ED Course User Index  [TT] SELVIN Gaviria                 Clinical Impression:   Final diagnoses:  [M54.9] Back pain, unspecified back location, unspecified back pain laterality, unspecified chronicity (Primary)  [R30.0] Dysuria        ED Disposition Condition    Discharge Stable          ED Prescriptions       Medication Sig Dispense Start Date End Date Auth. Provider    naproxen (NAPROSYN) 500 MG tablet Take 1 tablet (500 mg total) by mouth 2 (two) times daily with meals. 20 tablet 1/13/2023 -- SELVIN Gaviria    baclofen (LIORESAL) 10 MG tablet Take 1 tablet (10 mg total) by mouth 3 (three) times daily. for 10 days 30 tablet 1/13/2023 1/23/2023 SELVIN Gaviria          Follow-up Information       Follow up With  Specialties Details Why Contact Info [facility] - Emergency Dept Emergency Medicine In 3 days As needed, If symptoms worsen [address] [facility] [address] [phone]    Primary Care Provider  Schedule an appointment as soon as possible for a visit in 5 days  Call a PCP to make an appointment for follow up within 3-5  days.  You can all the phone number on the back of your insurance card for accepting providers as discussed.             SELVIN Gaviria  01/13/23 8460

## 2023-08-09 DIAGNOSIS — J30.9 ALLERGIC RHINITIS WITH POSTNASAL DRIP: ICD-10-CM

## 2023-08-09 DIAGNOSIS — R09.82 ALLERGIC RHINITIS WITH POSTNASAL DRIP: ICD-10-CM

## 2023-08-09 DIAGNOSIS — J02.9 SORE THROAT: Primary | ICD-10-CM

## 2023-10-19 ENCOUNTER — OFFICE VISIT (OUTPATIENT)
Dept: OTOLARYNGOLOGY | Facility: CLINIC | Age: 28
End: 2023-10-19
Payer: MEDICAID

## 2023-10-19 VITALS
HEIGHT: 67 IN | TEMPERATURE: 98 F | BODY MASS INDEX: 34.84 KG/M2 | SYSTOLIC BLOOD PRESSURE: 94 MMHG | DIASTOLIC BLOOD PRESSURE: 66 MMHG | RESPIRATION RATE: 18 BRPM | WEIGHT: 222 LBS | HEART RATE: 73 BPM | OXYGEN SATURATION: 100 %

## 2023-10-19 DIAGNOSIS — J02.9 SORE THROAT: ICD-10-CM

## 2023-10-19 DIAGNOSIS — J30.9 ALLERGIC RHINITIS WITH POSTNASAL DRIP: ICD-10-CM

## 2023-10-19 DIAGNOSIS — R09.82 ALLERGIC RHINITIS WITH POSTNASAL DRIP: ICD-10-CM

## 2023-10-19 PROCEDURE — 99214 OFFICE O/P EST MOD 30 MIN: CPT | Mod: PBBFAC | Performed by: OTOLARYNGOLOGY

## 2023-10-19 RX ORDER — FLUTICASONE PROPIONATE 50 MCG
1 SPRAY, SUSPENSION (ML) NASAL DAILY
Qty: 16 G | Refills: 3 | Status: SHIPPED | OUTPATIENT
Start: 2023-10-19 | End: 2023-11-22 | Stop reason: SDUPTHER

## 2023-10-19 RX ORDER — LITHIUM CARBONATE 300 MG
300 TABLET ORAL 2 TIMES DAILY WITH MEALS
COMMUNITY

## 2023-10-19 RX ORDER — AZELASTINE 1 MG/ML
1 SPRAY, METERED NASAL 2 TIMES DAILY
Qty: 30 ML | Refills: 0 | Status: SHIPPED | OUTPATIENT
Start: 2023-10-19 | End: 2024-10-18

## 2023-10-19 NOTE — PROGRESS NOTES
The scope used for the exam was:  Flexible scope ENF-P4  Serial Number:  1)    [id number]    []   2)    [id number]    [x]   3)    [id number]    []   4)    [id number]    []   5)    [id number]    []   6)    [id number]    []       The scope used for the exam was:  Rigid scope   Serial Number:  1)   [id number]    []   2)   [id number]    []   3)   7330    []   4)    3384   []   5)    0824   []   6)    5554   []     7)   7425    []   8)   2240    []   9)   1109    []

## 2023-10-19 NOTE — PROGRESS NOTES
[facility]  Otolaryngology Clinic Note    Julia Senior  Encounter Date: 10/19/2023  YOB: 1995  Physician: Sharifa Hawkins MD    Chief Complaint: ***    HPI: Julia Senior is a 28 y.o. female ***    ROS:   General: Negative except per HPI  Skin: Denies rash, ulcer, or lesion.  Eyes: Denies vision changes or diplopia.  Ears: Negative except per HPI  Nose: Negative except per HPI  Throat/mouth: Negative except per HPI  Cardiovascular: Negative except per HPI  Respiratory: Negative except per HPI  Neck: Negative except per HPI  Endocrine: Negative except per HPI  Neurologic: Negative except per HPI    Other 10-point review of systems negative except per HPI      Review of patient's allergies indicates:   Allergen Reactions    Amoxil [amoxicillin] Hives    Lamotrigine Hives    Penicillins Hives    Sulfa (sulfonamide antibiotics) Hives     bactrum    Amoxicillin Hives    Clindamycin Hives    Hydroxyzine Hallucinations    Ibuprofen Other (See Comments)    Norelgestromin-ethin.estradiol Hives    Penicillin Hives    Pseudoephedrine hcl Hives    Sulfamethoxazole-trimethoprim        Past Medical History:   Diagnosis Date    POTS (postural orthostatic tachycardia syndrome)     No problems for several years       Past Surgical History:   Procedure Laterality Date    adenoids         Social History     Socioeconomic History    Marital status:    Tobacco Use    Smoking status: Never    Smokeless tobacco: Never   Substance and Sexual Activity    Alcohol use: Yes    Drug use: Not Currently    Sexual activity: Yes     Partners: Female   Social History Narrative    ** Merged History Encounter **            No family history on file.    Outpatient Encounter Medications as of 10/19/2023   Medication Sig Dispense Refill    aripiprazole (ABILIFY) 10 MG disintegrating tablet Take 10 mg by mouth once.      baclofen (LIORESAL) 10 MG tablet Take 1 tablet (10 mg total) by mouth 3 (three)  times daily. for 10 days 30 tablet 0    melatonin 3 mg Tab Take 3 mg by mouth once daily.      naproxen (NAPROSYN) 500 MG tablet Take 1 tablet (500 mg total) by mouth 2 (two) times daily with meals. 20 tablet 0    norethindrone-e.estradiol-iron 1 mg-20 mcg(24) /75 mg (4) Chew Take 1 mg by mouth once daily.      quetiapine (SEROQUEL) 100 MG Tab Take 100 mg by mouth every evening.      risperiDONE (RISPERDAL) 1 MG tablet Take 1 mg by mouth 2 (two) times daily.      topiramate (TOPAMAX) 50 MG tablet Take 50 mg by mouth 2 (two) times daily.      valACYclovir (VALTREX) 500 MG tablet Take 500 mg by mouth 2 (two) times daily.      venlafaxine (EFFEXOR-XR) 37.5 MG 24 hr capsule Take 37.5 mg by mouth once daily.       No facility-administered encounter medications on file as of 10/19/2023.       Physical Exam:  There were no vitals filed for this visit.    Constitutional  General Appearance: well nourished, well-developed, AAO x3, NAD  HEENT***  Eyes: PEERLA, EOMI, normal conjunctivae  Ears: Hearing well at conversation level; powell - no lateralization, Rinne AC > BC   AD: auricle normal, EAC normal, TM intact, no MARLENI   AS: auricle normal, EAC normal, TM intact, no MARLENI   Vestibular: ambulates without gait disturbance  Nose: septum midline, no inferior turbinate hypertrophy, no polyps, moist mucosa without erythema or blue hue  OC/OP: dentition moderate, no oral lesions, tongue/FOM/BOT- soft, no leukoplakia/ulcerations/ tenderness, tonsils ***  Nasopharynx, Hypopharynx, and Larynx:    Indirect: attempted, limited view due to patient intolerance  Neck: soft, non-tender, no palpable lymph nodes   Thyroid region- no nodules or goiter  Neuro: CN II - XII intact bilaterally  Cardiovascular: peripheral pulses palpable  Respiratory: non-labored respirations  Psychiatric: oriented to time, place and person, no depression, anxiety or agitation    Procedures:Flexible Fiberoptic Laryngoscopy/Nasopharyngoscopy via ***nare    Procedure in  Detail: Informed consent was obtained from the patient after explanation of procedure, indications, risks and benefits. Flexible endoscopy was performed through the nasal passages. The nasal cavity, nasopharynx, oropharynx, hypopharynx and larynx were adequately visualized. The true vocal cords and arytenoids were examined during phonation and repose.    Anesthesia: Topical Neosynephrine / Tetracaine  Adverse Events: None  Resident Surgeon: ***   Blood loss: none  Condition: good    Findings:  NP/OP: no masses/lesions of NC, eustachian tube, fossa of Rosenmuller, no adenoid hypertrophy  BOT/vallecula: no lingual hypertrophy, no masses/lesions, no secretions obscuring visualization  Piriform sinuses/post-cricoid: no masses/lesions, no pooling of secretions  Epiglottis: lingual and laryngeal surfaces within normal limits  Arytenoids/FVFs: no masses/lesions, no edema, bilateral mobility  TVCs: bilateral cord mobility, no masses or lesions  No aspiration, no pooled secretions  No sign of malignancy      Pertinent Data:  ? LABS:  ? AUDIO:  ? CT Scan:    Imaging:   I personally reviewed the following images:    Summary of Outside Records:      Assessment/Plan:  Julia Senior is a 28 y.o. female ***    -       JAIMEE Parson MD  [facility] Department of Otolaryngology  [facility]

## 2023-10-19 NOTE — PROGRESS NOTES
[facility]  Otolaryngology Clinic Note    Julia Senior  Encounter Date: 10/19/2023  YOB: 1995  Physician: Dr. Spaulding    Chief Complaint: sore throat and post-nasal drip     HPI: Julia Senior is a 28 y.o. female with chronic episodic post nasal drip and sore throat for 15 years. She complains of hoarseness and difficulty breathing. She broke her nose at 13 years old without surgical reconstruction, and since then she has sinus congestion every 5 weeks for around 8 days regardless of season. She has been given antibiotics nearly every episode for years but does not find that they help relieve her symptoms or shorten the course. She has tried Flonase without resolution of symptoms. She uses an electric sinus flushing apparatus twice a day during symptomatic episodes. She denies symptoms of GERD - heartburn, reflux, belching. She works as a  and constantly has to take off of work for these issues. She has a past medical history of Pott's syndrome, symptomatic hypoglycemia, eczema, and migraines. She is not currently having sinus infection symptoms.       ROS:   General: Negative except per HPI  Skin: Atopic rash on arms and back, denies ulcer and lesion.  Eyes: Denies vision changes or diplopia.  Ears: Negative except per HPI  Nose: Negative except per HPI  Throat/mouth: Negative except per HPI  Cardiovascular: Negative except per HPI  Respiratory: Negative except per HPI  Neck: Negative except per HPI  Endocrine: Negative except per HPI  Neurologic: Negative except per HPI    Other 10-point review of systems negative except per HPI      Review of patient's allergies indicates:   Allergen Reactions    Amoxil [amoxicillin] Hives    Lamotrigine Hives    Penicillins Hives    Sulfa (sulfonamide antibiotics) Hives     bactrum    Amoxicillin Hives    Clindamycin Hives    Hydroxyzine Hallucinations    Ibuprofen Other (See Comments)    Norelgestromin-ethin.estradiol Hives     Penicillin Hives    Pseudoephedrine hcl Hives    Sulfamethoxazole-trimethoprim        Past Medical History:   Diagnosis Date    POTS (postural orthostatic tachycardia syndrome)     No problems for several years       Past Surgical History:   Procedure Laterality Date    adenoids         Social History     Socioeconomic History    Marital status:    Tobacco Use    Smoking status: Never    Smokeless tobacco: Never   Substance and Sexual Activity    Alcohol use: Yes    Drug use: Not Currently    Sexual activity: Yes     Partners: Female   Social History Narrative    ** Merged History Encounter **            History reviewed. No pertinent family history.    Outpatient Encounter Medications as of 10/19/2023   Medication Sig Dispense Refill    aripiprazole (ABILIFY) 10 MG disintegrating tablet Take 10 mg by mouth once.      baclofen (LIORESAL) 10 MG tablet Take 1 tablet (10 mg total) by mouth 3 (three) times daily. for 10 days 30 tablet 0    lithium (LITHOTAB) 300 mg tablet Take 300 mg by mouth 2 (two) times daily with meals.      melatonin 3 mg Tab Take 3 mg by mouth once daily.      naproxen (NAPROSYN) 500 MG tablet Take 1 tablet (500 mg total) by mouth 2 (two) times daily with meals. 20 tablet 0    norethindrone-e.estradiol-iron 1 mg-20 mcg(24) /75 mg (4) Chew Take 1 mg by mouth once daily.      quetiapine (SEROQUEL) 100 MG Tab Take 100 mg by mouth every evening.      risperiDONE (RISPERDAL) 1 MG tablet Take 1 mg by mouth 2 (two) times daily.      topiramate (TOPAMAX) 50 MG tablet Take 50 mg by mouth 2 (two) times daily.      valACYclovir (VALTREX) 500 MG tablet Take 500 mg by mouth 2 (two) times daily.      venlafaxine (EFFEXOR-XR) 37.5 MG 24 hr capsule Take 37.5 mg by mouth once daily.       No facility-administered encounter medications on file as of 10/19/2023.       Physical Exam:  Vitals:    10/19/23 0907   BP: 94/66   BP Location: Left arm   Patient Position: Sitting   BP Method: Large (Automatic)  "  Pulse: 73   Resp: 18   Temp: 97.7 °F (36.5 °C)   SpO2: 100%   Weight: 100.7 kg (222 lb)   Height: 5' 7" (1.702 m)       Constitutional  General Appearance: well nourished, well-developed, AAO x3, NAD  HEENT  Eyes: PEERLA, EOMI, normal conjunctivae  Ears: Hearing well at conversation level; powell - no lateralization, Rinne AC > BC   AD: auricle normal, EAC normal, TM intact, no MARLENI   AS: auricle normal, EAC normal, TM intact, no MARLENI   Vestibular: ambulates without gait disturbance  Nose: septum deviated leftward, no inferior turbinate hypertrophy, no polyps, moist mucosa erythematous on L side non erythematous or blue hue on the R side   OC/OP: dentition moderate, no oral lesions, tongue/FOM/BOT- soft, no leukoplakia/ulcerations/ tenderness   Nasopharynx, Hypopharynx, and Larynx:    Indirect: attempted, limited view due to obstruction from tongue, enlarged palatine tonsils, small mouth   Neck: soft, non-tender, no palpable lymph nodes, enlarged neck    Thyroid region- no nodules or goiter  Neuro: CN II - XII intact bilaterally  Cardiovascular: peripheral pulses palpable  Respiratory: non-labored respirations  Psychiatric: oriented to time, place and person, no depression, anxiety or agitation  Skin: eczema on arms bilaterally and back     Procedures:Flexible Fiberoptic Laryngoscopy/Nasopharyngoscopy via R and L nare     Procedure in Detail: Informed consent was obtained from the patient after explanation of procedure, indications, risks and benefits. Flexible endoscopy was performed through the nasal passages. The nasal cavity, nasopharynx, oropharynx, hypopharynx and larynx were adequately visualized. The true vocal cords and arytenoids were examined during phonation and repose.     Anesthesia: Topical Neosynephrine / Tetracaine in R nare  Adverse Events: None  Surgeon: Dr. Spaulding   Blood loss: none  Condition: good     Findings:  NP/OP: no masses/lesions of NC, eustachian tube, fossa of Rosenmuller, no " adenoids  BOT/vallecula: no lingual hypertrophy, no masses/lesions, no secretions obscuring visualization  Piriform sinuses/post-cricoid: no masses/lesions, no pooling of secretions  Epiglottis: lingual and laryngeal surfaces within normal limits  Arytenoids/FVFs: no masses/lesions, no edema, bilateral mobility  TVCs: bilateral cord mobility, no masses or lesions  No aspiration, no pooled secretions  No sign of malignancy    Assessment/Plan:  Julia Senior is a 28 y.o. female with chronic episodic post nasal drip, sore throat, and hoarseness. Episodes occur every 5 weeks regardless of time of year and environment. She has failed treatment with antibiotics for symptomatic episodes.      - Allergy testing  - X ray to visualize sinuses due to chronicity of symptoms and failure of treatment   - Patient shown correct way to use nasal spray    - Flush sinuses daily in morning and at night after work even if asymptomatic   - RTC one month after testing is complete    Nathaly Saba   Medical student

## 2023-10-24 NOTE — PROGRESS NOTES
[facility]  Otolaryngology Clinic Note    Julia Senior  Encounter Date: 10/19/2023  YOB: 1995  Physician: Dr. Spaulding    Chief Complaint: sore throat and post-nasal drip     HPI: Julia Senior is a 28 y.o. female with chronic episodic post nasal drip and sore throat for 15 years. She complains of hoarseness and difficulty breathing. She broke her nose at 13 years old without surgical reconstruction, and since then she has sinus congestion every 5 weeks for around 8 days regardless of season. She has been given antibiotics nearly every episode for years but does not find that they help relieve her symptoms or shorten the course. She has tried Flonase without resolution of symptoms. She uses an electric sinus flushing apparatus twice a day during symptomatic episodes. She denies symptoms of GERD - heartburn, reflux, belching. She works as a  and constantly has to take off of work for these issues. She has a past medical history of Pott's syndrome, symptomatic hypoglycemia, eczema, and migraines. She is not currently having sinus infection symptoms.       ROS:   General: Negative except per HPI  Skin: Atopic rash on arms and back, denies ulcer and lesion.  Eyes: Denies vision changes or diplopia.  Ears: Negative except per HPI  Nose: Negative except per HPI  Throat/mouth: Negative except per HPI  Cardiovascular: Negative except per HPI  Respiratory: Negative except per HPI  Neck: Negative except per HPI  Endocrine: Negative except per HPI  Neurologic: Negative except per HPI    Other 10-point review of systems negative except per HPI      Review of patient's allergies indicates:   Allergen Reactions    Amoxil [amoxicillin] Hives    Lamotrigine Hives    Penicillins Hives    Sulfa (sulfonamide antibiotics) Hives     bactrum    Amoxicillin Hives    Clindamycin Hives    Hydroxyzine Hallucinations    Ibuprofen Other (See Comments)    Norelgestromin-ethin.estradiol Hives     Penicillin Hives    Pseudoephedrine hcl Hives    Sulfamethoxazole-trimethoprim        Past Medical History:   Diagnosis Date    POTS (postural orthostatic tachycardia syndrome)     No problems for several years       Past Surgical History:   Procedure Laterality Date    adenoids         Social History     Socioeconomic History    Marital status:    Tobacco Use    Smoking status: Never    Smokeless tobacco: Never   Substance and Sexual Activity    Alcohol use: Yes    Drug use: Not Currently    Sexual activity: Yes     Partners: Female   Social History Narrative    ** Merged History Encounter **            History reviewed. No pertinent family history.    Outpatient Encounter Medications as of 10/19/2023   Medication Sig Dispense Refill    aripiprazole (ABILIFY) 10 MG disintegrating tablet Take 10 mg by mouth once.      baclofen (LIORESAL) 10 MG tablet Take 1 tablet (10 mg total) by mouth 3 (three) times daily. for 10 days 30 tablet 0    lithium (LITHOTAB) 300 mg tablet Take 300 mg by mouth 2 (two) times daily with meals.      melatonin 3 mg Tab Take 3 mg by mouth once daily.      naproxen (NAPROSYN) 500 MG tablet Take 1 tablet (500 mg total) by mouth 2 (two) times daily with meals. 20 tablet 0    norethindrone-e.estradiol-iron 1 mg-20 mcg(24) /75 mg (4) Chew Take 1 mg by mouth once daily.      quetiapine (SEROQUEL) 100 MG Tab Take 100 mg by mouth every evening.      risperiDONE (RISPERDAL) 1 MG tablet Take 1 mg by mouth 2 (two) times daily.      topiramate (TOPAMAX) 50 MG tablet Take 50 mg by mouth 2 (two) times daily.      valACYclovir (VALTREX) 500 MG tablet Take 500 mg by mouth 2 (two) times daily.      venlafaxine (EFFEXOR-XR) 37.5 MG 24 hr capsule Take 37.5 mg by mouth once daily.       No facility-administered encounter medications on file as of 10/19/2023.       Physical Exam:  Vitals:    10/19/23 0907   BP: 94/66   BP Location: Left arm   Patient Position: Sitting   BP Method: Large (Automatic)  "  Pulse: 73   Resp: 18   Temp: 97.7 °F (36.5 °C)   SpO2: 100%   Weight: 100.7 kg (222 lb)   Height: 5' 7" (1.702 m)       Constitutional  General Appearance: well nourished, well-developed, AAO x3, NAD  HEENT  Eyes: PEERLA, EOMI, normal conjunctivae  Ears: Hearing well at conversation level; powell - no lateralization, Rinne AC > BC   AD: auricle normal, EAC normal, TM intact, no MARLENI   AS: auricle normal, EAC normal, TM intact, no MARLENI   Vestibular: ambulates without gait disturbance  Nose: septum deviated leftward, no inferior turbinate hypertrophy, no polyps, moist mucosa erythematous on L side non erythematous or blue hue on the R side   OC/OP: dentition moderate, no oral lesions, tongue/FOM/BOT- soft, no leukoplakia/ulcerations/ tenderness   Nasopharynx, Hypopharynx, and Larynx:    Indirect: attempted, limited view due to obstruction from tongue, enlarged palatine tonsils, small mouth   Neck: soft, non-tender, no palpable lymph nodes, enlarged neck    Thyroid region- no nodules or goiter  Neuro: CN II - XII intact bilaterally  Cardiovascular: peripheral pulses palpable  Respiratory: non-labored respirations  Psychiatric: oriented to time, place and person, no depression, anxiety or agitation  Skin: eczema on arms bilaterally and back     Procedures:Flexible Fiberoptic Laryngoscopy/Nasopharyngoscopy via R and L nare     Procedure in Detail: Informed consent was obtained from the patient after explanation of procedure, indications, risks and benefits. Flexible endoscopy was performed through the nasal passages. The nasal cavity, nasopharynx, oropharynx, hypopharynx and larynx were adequately visualized. The true vocal cords and arytenoids were examined during phonation and repose.     Anesthesia: Topical Neosynephrine / Tetracaine in R nare  Adverse Events: None  Surgeon: Dr. Spaulding   Blood loss: none  Condition: good     Findings:  NP/OP: no masses/lesions of NC, eustachian tube, fossa of Rosenmuller, no " adenoids  BOT/vallecula: no lingual hypertrophy, no masses/lesions, no secretions obscuring visualization  Piriform sinuses/post-cricoid: no masses/lesions, no pooling of secretions  Epiglottis: lingual and laryngeal surfaces within normal limits  Arytenoids/FVFs: no masses/lesions, no edema, bilateral mobility  TVCs: bilateral cord mobility, no masses or lesions  No aspiration, no pooled secretions  No sign of malignancy    Assessment/Plan:  Julia Senior is a 28 y.o. female with chronic episodic post nasal drip, sore throat, and hoarseness. Episodes occur every 5 weeks regardless of time of year and environment. She has failed treatment with antibiotics for symptomatic episodes.      - Allergy testing  - X ray to visualize sinuses due to chronicity of symptoms and failure of treatment   - Patient shown correct way to use nasal spray    - Flush sinuses daily in morning and at night after work even if asymptomatic   - RTC one month after testing is complete    Nathaly Saba   Medical student

## 2023-11-22 ENCOUNTER — OFFICE VISIT (OUTPATIENT)
Dept: OTOLARYNGOLOGY | Facility: CLINIC | Age: 28
End: 2023-11-22
Payer: MEDICAID

## 2023-11-22 VITALS
HEART RATE: 70 BPM | DIASTOLIC BLOOD PRESSURE: 81 MMHG | HEIGHT: 67 IN | BODY MASS INDEX: 34.69 KG/M2 | SYSTOLIC BLOOD PRESSURE: 115 MMHG | RESPIRATION RATE: 16 BRPM | WEIGHT: 221 LBS | TEMPERATURE: 99 F

## 2023-11-22 DIAGNOSIS — J32.8 OTHER CHRONIC SINUSITIS: Primary | ICD-10-CM

## 2023-11-22 PROCEDURE — 99214 OFFICE O/P EST MOD 30 MIN: CPT | Mod: PBBFAC | Performed by: OTOLARYNGOLOGY

## 2023-11-22 RX ORDER — DOXYCYCLINE 100 MG/1
100 CAPSULE ORAL 2 TIMES DAILY
Qty: 42 CAPSULE | Refills: 0 | Status: SHIPPED | OUTPATIENT
Start: 2023-11-22 | End: 2023-12-13

## 2023-11-22 RX ORDER — FAMOTIDINE 20 MG/1
20 TABLET, FILM COATED ORAL
COMMUNITY
Start: 2023-08-07

## 2023-11-22 RX ORDER — TIZANIDINE 4 MG/1
4 TABLET ORAL DAILY
COMMUNITY
Start: 2023-10-25

## 2023-11-22 RX ORDER — FLUTICASONE PROPIONATE 50 MCG
2 SPRAY, SUSPENSION (ML) NASAL DAILY
Qty: 15.8 ML | Refills: 6 | Status: SHIPPED | OUTPATIENT
Start: 2023-11-22

## 2023-11-22 RX ORDER — CETIRIZINE HYDROCHLORIDE 10 MG/1
10 TABLET ORAL
COMMUNITY
Start: 2023-05-05

## 2023-11-22 RX ORDER — PAROXETINE 10 MG/1
1 TABLET, FILM COATED ORAL DAILY
COMMUNITY
Start: 2023-11-03

## 2023-11-22 NOTE — LETTER
November 22, 2023      [facility], [address]  [address] [address]  Phone: [phone]       Patient: Julia Senior   YOB: 1995  Date of Visit: 11/22/2023    To Whom It May Concern:    Dillon Senior  was at [facility] on 11/22/2023. The patient may return to work. If you have any questions or concerns, or if I can be of further assistance, please do not hesitate to contact me.    Sincerely,    Purvi Mello LPN

## 2023-11-22 NOTE — PROGRESS NOTES
Patient Name: Julia Senior   YOB: 1995     Chief Complaint:  Follow-up for sinusitis       History of Present Illness:  October 19, 2023:   Julia Senior is a 28 y.o. female with chronic episodic post nasal drip and sore throat for 15 years. She complains of hoarseness and difficulty breathing. She broke her nose at 13 years old without surgical reconstruction, and since then she has sinus congestion every 5 weeks for around 8 days regardless of season. She has been given antibiotics nearly every episode for years but does not find that they help relieve her symptoms or shorten the course. She has tried Flonase without resolution of symptoms. She uses an electric sinus flushing apparatus twice a day during symptomatic episodes. She denies symptoms of GERD - heartburn, reflux, belching. She works as a  and constantly has to take off of work for these issues. She has a past medical history of Pott's syndrome, symptomatic hypoglycemia, eczema, and migraines. She is not currently having sinus infection symptoms.     November 22, 2023:   Patient presents today for follow-up of her chronic sinusitis.  In reviewing the patient's history she would indicated that she is had problems with her nose and sinuses which had developed after a nasal fracture at the age of 13 which had not been repaired.  Since that time she is had problems with perennial nasal congestion, postnasal drainage, associated cough which is occasionally productive, sore throat, and facial pressure.  This tends to be worse in the spring and the fall and is also exacerbated by exposure to cigarette smoke and also with weather changes.  Other symptoms include malaise.  Frequently her nasal drainage we will be discolored.  She has been treated with antibiotics on multiple occasions.  She is usually treated 3-4 times per year and possibly more.  In 1 year previously she had been treated 9 times with antibiotics.  She is not been  treated with antibiotics in the recent past.  She has been treated with various nasal sprays in the past and had not found any long-lasting relief.  She is currently on fluticasone nasal spray which she uses routinely.  She is also on saline irrigations 1-2 times daily.  She has also been treated with azelastine in the past but then had only cause nasal burning.  She does not find much relief with these nasal sprays.  She does have a history of eczema for which she takes cetirizine.  This cetirizine is not helpful with her nasal or sinus symptoms. She does not have problems with paroxysms of sneezing or ocular itching.  Patient does have a history of some food allergies.  Never been on immunotherapy.  Patient did not have her ImmunoCAP allergy assay done as of yet.  Patient is a nonsmoker.  She works as a .    Past Medical History:  Past Medical History:   Diagnosis Date    POTS (postural orthostatic tachycardia syndrome)     No problems for several years     Past Surgical History:   Procedure Laterality Date    adenoids         Social History:  Social History     Socioeconomic History    Marital status:    Tobacco Use    Smoking status: Never    Smokeless tobacco: Never   Substance and Sexual Activity    Alcohol use: Yes    Drug use: Not Currently    Sexual activity: Yes     Partners: Female   Social History Narrative    ** Merged History Encounter **            Review of Systems:  Unremarkable except as mentioned above.    Current Medications:  Current Outpatient Medications   Medication Sig    azelastine (ASTELIN) 137 mcg (0.1 %) nasal spray 1 spray (137 mcg total) by Nasal route 2 (two) times daily.    cetirizine (ZYRTEC) 10 MG tablet Take 10 mg by mouth.    famotidine (PEPCID) 20 MG tablet Take 20 mg by mouth.    lithium (LITHOTAB) 300 mg tablet Take 300 mg by mouth 2 (two) times daily with meals.    melatonin 3 mg Tab Take 3 mg by mouth once daily.    paroxetine (PAXIL) 10 MG tablet Take 1  tablet by mouth once daily.    tiZANidine (ZANAFLEX) 4 MG tablet Take 4 mg by mouth.    topiramate (TOPAMAX) 50 MG tablet Take 50 mg by mouth 2 (two) times daily.    aripiprazole (ABILIFY) 10 MG disintegrating tablet Take 10 mg by mouth once.    baclofen (LIORESAL) 10 MG tablet Take 1 tablet (10 mg total) by mouth 3 (three) times daily. for 10 days    doxycycline (MONODOX) 100 MG capsule Take 1 capsule (100 mg total) by mouth 2 (two) times daily. for 21 days    fluticasone propionate (FLONASE) 50 mcg/actuation nasal spray 2 sprays (100 mcg total) by Each Nostril route once daily.    naproxen (NAPROSYN) 500 MG tablet Take 1 tablet (500 mg total) by mouth 2 (two) times daily with meals. (Patient not taking: Reported on 11/22/2023)    norethindrone-e.estradiol-iron 1 mg-20 mcg(24) /75 mg (4) Chew Take 1 mg by mouth once daily.    quetiapine (SEROQUEL) 100 MG Tab Take 100 mg by mouth every evening.    risperiDONE (RISPERDAL) 1 MG tablet Take 1 mg by mouth 2 (two) times daily.    valACYclovir (VALTREX) 500 MG tablet Take 500 mg by mouth 2 (two) times daily.    venlafaxine (EFFEXOR-XR) 37.5 MG 24 hr capsule Take 37.5 mg by mouth once daily.     No current facility-administered medications for this visit.        Allergies:  Review of patient's allergies indicates:   Allergen Reactions    Amoxil [amoxicillin] Hives    Lamotrigine Hives    Penicillins Hives    Sulfa (sulfonamide antibiotics) Hives     bactrum    Amoxicillin Hives    Clindamycin Hives    Hydroxyzine Hallucinations    Ibuprofen Other (See Comments)    Norelgestromin-ethin.estradiol Hives    Penicillin Hives    Pseudoephedrine hcl Hives    Sulfamethoxazole-trimethoprim         Family History:  History reviewed. No pertinent family history.    Physical Exam:  Vital signs:   Vitals:    11/22/23 0906   BP: 115/81   BP Location: Right forearm   Patient Position: Sitting   BP Method: Medium (Automatic)   Pulse: 70   Resp: 16   Temp: 98.8 °F (37.1 °C)   TempSrc: Oral  "  Weight: 100.2 kg (221 lb)   Height: 5' 7" (1.702 m)   General:  Well-developed well-nourished female in no acute distress.  She does have a slight hyponasal quality to her voice.  Head and face:  Normocephalic.  No facial lesions.  No temporomandibular joint tenderness or click.  Ears:  Right ear-auricle is normally developed.  External auditory canal is clear.  Tympanic membrane is nonerythematous.  No middle ear effusion.  Left ear-auricle is normally developed.  External auditory canal is clear.  Tympanic membrane is nonerythematous.  No middle ear effusion.  Nose: Nasal dorsum unremarkable.  She does have slight left septal deviation.  Moderate intranasal congestion with mucoid secretions.    Oral cavity and oropharynx: Tongue and floor mouth are without lesions.  She does have some cobblestoning of the posterior pharyngeal wall with mild erythema but no exudates.  No tonsil hypertrophy.  Neck:  Supple without adenopathy or thyromegaly.  Trachea is in the midline.  Parotid and submandibular glands are normal to palpation without masses or tenderness.  Eyes:  Extraocular muscles intact.  No nystagmus.  No exophthalmos or enophthalmos.  Neurologic:  Alert and oriented.  Cranial nerves 2-12 are grossly normal.        Assessment/Plan:  28-year-old female with history of chronic rhinosinusitis.      Plan:   ImmunoCAP allergy assay region 6.    Continue fluticasone nasal spray 2 puffs each nostril q.d. and saline irrigations twice daily.  Continue cetirizine.    Doxycycline 100 mg p.o. b.i.d. for 3 weeks.  CT scan of the sinuses in 4 weeks with follow-up in 5 weeks.      Roberto Alonzo M.D.    "

## 2023-12-18 ENCOUNTER — HOSPITAL ENCOUNTER (OUTPATIENT)
Dept: RADIOLOGY | Facility: HOSPITAL | Age: 28
Discharge: HOME | End: 2023-12-18
Attending: OTOLARYNGOLOGY
Payer: MEDICAID

## 2023-12-18 DIAGNOSIS — J32.8 OTHER CHRONIC SINUSITIS: ICD-10-CM

## 2023-12-18 PROCEDURE — 70486 CT MAXILLOFACIAL W/O DYE: CPT | Mod: TC

## 2024-01-03 ENCOUNTER — OFFICE VISIT (OUTPATIENT)
Dept: OTOLARYNGOLOGY | Facility: CLINIC | Age: 29
End: 2024-01-03
Payer: MEDICAID

## 2024-01-03 VITALS
WEIGHT: 210 LBS | BODY MASS INDEX: 32.96 KG/M2 | TEMPERATURE: 98 F | SYSTOLIC BLOOD PRESSURE: 100 MMHG | DIASTOLIC BLOOD PRESSURE: 67 MMHG | HEART RATE: 73 BPM | RESPIRATION RATE: 16 BRPM | HEIGHT: 67 IN

## 2024-01-03 DIAGNOSIS — J32.4 CHRONIC PANSINUSITIS: Primary | ICD-10-CM

## 2024-01-03 DIAGNOSIS — J34.3 NASAL TURBINATE HYPERTROPHY: ICD-10-CM

## 2024-01-03 DIAGNOSIS — J34.2 NASAL SEPTAL DEVIATION: ICD-10-CM

## 2024-01-03 PROCEDURE — 99215 OFFICE O/P EST HI 40 MIN: CPT | Mod: PBBFAC | Performed by: OTOLARYNGOLOGY

## 2024-01-03 RX ORDER — DOXYCYCLINE 100 MG/1
100 CAPSULE ORAL 2 TIMES DAILY
Qty: 20 CAPSULE | Refills: 0 | Status: SHIPPED | OUTPATIENT
Start: 2024-01-03 | End: 2024-01-13

## 2024-01-03 RX ORDER — DIVALPROEX SODIUM 500 MG/1
500 TABLET, DELAYED RELEASE ORAL 2 TIMES DAILY
COMMUNITY
Start: 2023-12-07

## 2024-01-03 NOTE — H&P (VIEW-ONLY)
Patient Name: Julia Senior   YOB: 1995     Chief Complaint: follow-up for chronic sinusitis       History of Present Illness:  October 19, 2023:   Julia Senior is a 28 y.o. female with chronic episodic post nasal drip and sore throat for 15 years. She complains of hoarseness and difficulty breathing. She broke her nose at 13 years old without surgical reconstruction, and since then she has sinus congestion every 5 weeks for around 8 days regardless of season. She has been given antibiotics nearly every episode for years but does not find that they help relieve her symptoms or shorten the course. She has tried Flonase without resolution of symptoms. She uses an electric sinus flushing apparatus twice a day during symptomatic episodes. She denies symptoms of GERD - heartburn, reflux, belching. She works as a  and constantly has to take off of work for these issues. She has a past medical history of Pott's syndrome, symptomatic hypoglycemia, eczema, and migraines. She is not currently having sinus infection symptoms.     November 22, 2023:   Patient presents today for follow-up of her chronic sinusitis.  In reviewing the patient's history she would indicated that she is had problems with her nose and sinuses which had developed after a nasal fracture at the age of 13 which had not been repaired.  Since that time she is had problems with perennial nasal congestion, postnasal drainage, associated cough which is occasionally productive, sore throat, and facial pressure.  This tends to be worse in the spring and the fall and is also exacerbated by exposure to cigarette smoke and also with weather changes.  Other symptoms include malaise.  Frequently her nasal drainage we will be discolored.  She has been treated with antibiotics on multiple occasions.  She is usually treated 3-4 times per year and possibly more.  In 1 year previously she had been treated 9 times with antibiotics.  She is not been  treated with antibiotics in the recent past.  She has been treated with various nasal sprays in the past and had not found any long-lasting relief.  She is currently on fluticasone nasal spray which she uses routinely.  She is also on saline irrigations 1-2 times daily.  She has also been treated with azelastine in the past but then had only cause nasal burning.  She does not find much relief with these nasal sprays.  She does have a history of eczema for which she takes cetirizine.  This cetirizine is not helpful with her nasal or sinus symptoms. She does not have problems with paroxysms of sneezing or ocular itching.  Patient does have a history of some food allergies.  Never been on immunotherapy.  Patient did not have her ImmunoCAP allergy assay done as of yet.  Patient is a nonsmoker.  She works as a .    January 3, 2024:   Patient presents today for follow-up of her chronic rhinosinusitis.  She completed the doxycycline that was prescribed at her last visit.  She also continues to take fluticasone nasal spray, saline irrigations, and azelastine nasal spray.  She has not really had any change in her symptoms.    Review of her CT scan of the sinuses done on December 18, 2023, shows the presence of mucosal edema involving both frontal, both ethmoid, and both maxillary sinuses with obstruction of the ostiomeatal complex.  There is a small amount of mucosal edema within the left sphenoid sinus.  She does appear to have some mucosal edema obstructing the sphenoid sinus ostia bilaterally.  She also has right septal deviation and right inferior turbinate hypertrophy.  ImmunoCAP allergy assay showed her to have a normal IgE level.  She did have elevated titers to ragweed, marsh elder, dust mite, cockroach, and Bermuda grass.  Results were discussed with the patient and her .  A new problem today is that of a several day history of some tender swelling in her right upper neck.  This is associated with  some pain in the area of her right most posterior mandibular molar.  No fever sore throat.  She has not had any treatment.    Past Medical History:  Past Medical History:   Diagnosis Date    POTS (postural orthostatic tachycardia syndrome)     No problems for several years     Past Surgical History:   Procedure Laterality Date    adenoids         Social History:  Social History     Socioeconomic History    Marital status:    Tobacco Use    Smoking status: Never    Smokeless tobacco: Never   Substance and Sexual Activity    Alcohol use: Yes    Drug use: Not Currently    Sexual activity: Yes     Partners: Female   Social History Narrative    ** Merged History Encounter **            Review of Systems:  Unremarkable except as mentioned above.    Current Medications:  Current Outpatient Medications   Medication Sig    azelastine (ASTELIN) 137 mcg (0.1 %) nasal spray 1 spray (137 mcg total) by Nasal route 2 (two) times daily.    cetirizine (ZYRTEC) 10 MG tablet Take 10 mg by mouth.    divalproex (DEPAKOTE) 500 MG TbEC Take 500 mg by mouth 2 (two) times daily.    famotidine (PEPCID) 20 MG tablet Take 20 mg by mouth.    fluticasone propionate (FLONASE) 50 mcg/actuation nasal spray 2 sprays (100 mcg total) by Each Nostril route once daily.    lithium (LITHOTAB) 300 mg tablet Take 300 mg by mouth 2 (two) times daily with meals.    melatonin 3 mg Tab Take 3 mg by mouth once daily.    norethindrone-e.estradiol-iron 1 mg-20 mcg(24) /75 mg (4) Chew Take 1 mg by mouth once daily.    paroxetine (PAXIL) 10 MG tablet Take 1 tablet by mouth once daily.    tiZANidine (ZANAFLEX) 4 MG tablet Take 4 mg by mouth.    topiramate (TOPAMAX) 50 MG tablet Take 50 mg by mouth 2 (two) times daily.    valACYclovir (VALTREX) 500 MG tablet Take 500 mg by mouth 2 (two) times daily.    aripiprazole (ABILIFY) 10 MG disintegrating tablet Take 10 mg by mouth once.    baclofen (LIORESAL) 10 MG tablet Take 1 tablet (10 mg total) by mouth 3 (three)  "times daily. for 10 days    naproxen (NAPROSYN) 500 MG tablet Take 1 tablet (500 mg total) by mouth 2 (two) times daily with meals. (Patient not taking: Reported on 11/22/2023)    quetiapine (SEROQUEL) 100 MG Tab Take 100 mg by mouth every evening.    risperiDONE (RISPERDAL) 1 MG tablet Take 1 mg by mouth 2 (two) times daily.    venlafaxine (EFFEXOR-XR) 37.5 MG 24 hr capsule Take 37.5 mg by mouth once daily.     No current facility-administered medications for this visit.        Allergies:  Review of patient's allergies indicates:   Allergen Reactions    Amoxil [amoxicillin] Hives    Lamotrigine Hives    Penicillins Hives    Sulfa (sulfonamide antibiotics) Hives     bactrum    Amoxicillin Hives    Clindamycin Hives    Hydroxyzine Hallucinations    Ibuprofen Other (See Comments)    Norelgestromin-ethin.estradiol Hives    Penicillin Hives    Pseudoephedrine hcl Hives    Sulfamethoxazole-trimethoprim         Family History:  History reviewed. No pertinent family history.    Physical Exam:  Vital signs:   Vitals:    01/03/24 1058   BP: 100/67   BP Location: Right forearm   Patient Position: Sitting   BP Method: Medium (Automatic)   Pulse: 73   Resp: 16   Temp: 98.1 °F (36.7 °C)   TempSrc: Oral   Weight: 95.3 kg (210 lb)   Height: 5' 7" (1.702 m)   General:  Well-developed well-nourished female in no acute distress.  She does have a slight hyponasal quality to her voice.  Head and face:  Normocephalic.  No facial lesions.  No temporomandibular joint tenderness or click.  Ears:  Right ear-auricle is normally developed.  External auditory canal is clear.  Tympanic membrane is nonerythematous.  No middle ear effusion.  Left ear-auricle is normally developed.  External auditory canal is clear.  Tympanic membrane is nonerythematous.  No middle ear effusion.  Nose: Nasal dorsum unremarkable.  She does have right septal deviation.  Moderate intranasal congestion with mucoid secretions.    Oral cavity and oropharynx: Tongue and " floor mouth are without lesions.  She does have some cobblestoning of the posterior pharyngeal wall with mild erythema but no exudates.  No tonsil hypertrophy.  She does have tenderness to percussion involving tooth number 2.  Neck:  Supple without thyromegaly.  She does have mildly tender right level 2 lymphadenopathy.  Trachea is in the midline.  Parotid and submandibular glands are normal to palpation without masses or tenderness.  Eyes:  Extraocular muscles intact.  No nystagmus.  No exophthalmos or enophthalmos.  Neurologic:  Alert and oriented.  Cranial nerves 2-12 are grossly normal.       Assessment/Plan:  Chronic pansinusitis, septal deviation, turbinate hypertrophy.    Allergic rhinitis.  Odontogenic infection (tooth 2.)     Plan:  Discussed treatment options for chronic pansinusitis.  This includes continued medical therapy versus surgical intervention.  She would like to proceed with surgical intervention.  We will plan for bilateral endoscopic ethmoidectomy, maxillary sinus antrostomy, frontal sinus exploration, and bilateral sphenoid sinus sinusotomy.  Also plan for septoplasty and either turbinate reduction or lateralization of the inferior turbinates.  Risks of the procedure including continued recurrent sinusitis, bleeding, infection, septal perforation, intranasal adhesions, injury to the eye (with possible blindness), nasal lacrimal duct injury, cerebrospinal fluid leak, numbness of the upper teeth and gums were discussed.  Patient would like proceed with surgery and this is scheduled for January 19, 2024.  For dental infection she will be placed on doxycycline 100 mg b.i.d. for 10 days and I have recommended that she follow-up with a dentist.   She will be seen for follow-up 1 week postoperatively.    Roberto Alonzo M.D.

## 2024-01-11 ENCOUNTER — ANESTHESIA EVENT (OUTPATIENT)
Dept: SURGERY | Facility: HOSPITAL | Age: 29
End: 2024-01-11
Payer: MEDICAID

## 2024-01-11 PROBLEM — G90.A POTS (POSTURAL ORTHOSTATIC TACHYCARDIA SYNDROME): Status: ACTIVE | Noted: 2024-01-11

## 2024-01-11 NOTE — ANESTHESIA PREPROCEDURE EVALUATION
01/11/2024  Julia Senior is a 28 y.o. female presenting for FESS (FUNCTIONAL ENDOSCOPIC SINUS SURGERY), SEPTOPLASTY with a history of obesity, POTS (no reported issues x years), GERD, depression,     BETA-BLOCKER:   None       New Orders for Anesthesia: UPT DOS    Patient Active Problem List   Diagnosis    Rectal bleed    POTS (postural orthostatic tachycardia syndrome)     Pre-op Assessment    I have reviewed the NPO Status.      Review of Systems  Anesthesia Hx:  No problems with previous Anesthesia                Social:  Non-Smoker       Cardiovascular:  Cardiovascular Normal                                            Pulmonary:  Pulmonary Normal                       Renal/:  Renal/ Normal                 Hepatic/GI:     GERD, well controlled             Neurological:  Neurology Normal                                      Endocrine:        Obesity / BMI > 30  Psych:    depression              Vitals:    01/19/24 0854 01/19/24 1005 01/19/24 1214   BP:  106/69 110/78   BP Location:  Left arm    Patient Position:  Sitting    Pulse:  61 68   Resp:  (!) 22 20   Temp:  36.4 °C (97.6 °F) 36 °C (96.8 °F)   TempSrc:  Oral Temporal   SpO2:  98% 100%   Weight: 97.5 kg (215 lb)           Physical Exam  General: Alert, Cooperative and Well nourished    Airway:  Mallampati: II   Mouth Opening: Normal  TM Distance: Normal  Tongue: Normal  Neck ROM: Normal ROM    Dental:  Intact    Chest/Lungs:  Normal Respiratory Rate, Clear to auscultation    Heart:  Rate: Normal  Rhythm: Regular Rhythm  Sounds: Normal       Latest Reference Range & Units 01/19/24 12:16   Preg Test, Ur Negative  Negative     Lab Results   Component Value Date    WBC 11.30 01/19/2024    HGB 12.2 01/19/2024    HCT 36.4 (L) 01/19/2024    MCV 84.3 01/19/2024     01/19/2024         CMP  Sodium Level   Date Value Ref Range Status   10/30/2022  141 136 - 145 mmol/L Final     Potassium Level   Date Value Ref Range Status   10/30/2022 3.6 3.5 - 5.1 mmol/L Final     Carbon Dioxide   Date Value Ref Range Status   10/30/2022 23 22 - 29 mmol/L Final     Blood Urea Nitrogen   Date Value Ref Range Status   10/30/2022 13.5 7.0 - 18.7 mg/dL Final     Creatinine   Date Value Ref Range Status   10/30/2022 0.76 0.55 - 1.02 mg/dL Final     Calcium Level Total   Date Value Ref Range Status   10/30/2022 8.9 8.4 - 10.2 mg/dL Final     Albumin Level   Date Value Ref Range Status   10/30/2022 3.6 3.5 - 5.0 gm/dL Final     Bilirubin Total   Date Value Ref Range Status   10/30/2022 0.2 <=1.5 mg/dL Final     Alkaline Phosphatase   Date Value Ref Range Status   10/30/2022 86 40 - 150 unit/L Final     Aspartate Aminotransferase   Date Value Ref Range Status   10/30/2022 12 5 - 34 unit/L Final     Alanine Aminotransferase   Date Value Ref Range Status   10/30/2022 11 0 - 55 unit/L Final     eGFR   Date Value Ref Range Status   10/30/2022 >60 mls/min/1.73/m2 Final               Anesthesia Plan  Type of Anesthesia, risks & benefits discussed:    Anesthesia Type: Gen ETT  Intra-op Monitoring Plan: Standard ASA Monitors  Post Op Pain Control Plan: IV/PO Opioids PRN  Induction:  IV  Airway Plan: Direct  Informed Consent: Informed consent signed with the Patient and all parties understand the risks and agree with anesthesia plan.  All questions answered.   ASA Score: 2  Day of Surgery Review of History & Physical: H&P Update referred to the surgeon/provider.    Ready For Surgery From Anesthesia Perspective.     .

## 2024-01-19 ENCOUNTER — HOSPITAL ENCOUNTER (OUTPATIENT)
Facility: HOSPITAL | Age: 29
Discharge: HOME | End: 2024-01-19
Attending: OTOLARYNGOLOGY | Admitting: OTOLARYNGOLOGY
Payer: MEDICAID

## 2024-01-19 ENCOUNTER — ANESTHESIA (OUTPATIENT)
Dept: SURGERY | Facility: HOSPITAL | Age: 29
End: 2024-01-19
Payer: MEDICAID

## 2024-01-19 VITALS
TEMPERATURE: 97 F | RESPIRATION RATE: 20 BRPM | BODY MASS INDEX: 33.67 KG/M2 | OXYGEN SATURATION: 95 % | WEIGHT: 215 LBS | DIASTOLIC BLOOD PRESSURE: 75 MMHG | SYSTOLIC BLOOD PRESSURE: 127 MMHG | HEART RATE: 96 BPM

## 2024-01-19 DIAGNOSIS — Z01.818 PRE-OP EXAMINATION: Primary | ICD-10-CM

## 2024-01-19 DIAGNOSIS — J34.2 NASAL SEPTAL DEVIATION: ICD-10-CM

## 2024-01-19 DIAGNOSIS — J32.4 CHRONIC PANSINUSITIS: ICD-10-CM

## 2024-01-19 DIAGNOSIS — Z01.818 PRE-OP EVALUATION: ICD-10-CM

## 2024-01-19 DIAGNOSIS — J34.3 NASAL TURBINATE HYPERTROPHY: ICD-10-CM

## 2024-01-19 LAB
B-HCG UR QL: NEGATIVE
BASOPHILS # BLD AUTO: 0.03 X10(3)/MCL
BASOPHILS NFR BLD AUTO: 0.3 %
CTP QC/QA: YES
EOSINOPHIL # BLD AUTO: 0.55 X10(3)/MCL (ref 0–0.9)
EOSINOPHIL NFR BLD AUTO: 4.9 %
ERYTHROCYTE [DISTWIDTH] IN BLOOD BY AUTOMATED COUNT: 13.8 % (ref 11.5–17)
HCT VFR BLD AUTO: 36.4 % (ref 37–47)
HGB BLD-MCNC: 12.2 G/DL (ref 12–16)
IMM GRANULOCYTES # BLD AUTO: 0.02 X10(3)/MCL (ref 0–0.04)
IMM GRANULOCYTES NFR BLD AUTO: 0.2 %
LYMPHOCYTES # BLD AUTO: 4.04 X10(3)/MCL (ref 0.6–4.6)
LYMPHOCYTES NFR BLD AUTO: 35.8 %
MCH RBC QN AUTO: 28.2 PG (ref 27–31)
MCHC RBC AUTO-ENTMCNC: 33.5 G/DL (ref 33–36)
MCV RBC AUTO: 84.3 FL (ref 80–94)
MONOCYTES # BLD AUTO: 0.44 X10(3)/MCL (ref 0.1–1.3)
MONOCYTES NFR BLD AUTO: 3.9 %
NEUTROPHILS # BLD AUTO: 6.22 X10(3)/MCL (ref 2.1–9.2)
NEUTROPHILS NFR BLD AUTO: 54.9 %
NRBC BLD AUTO-RTO: 0 %
PLATELET # BLD AUTO: 246 X10(3)/MCL (ref 130–400)
PMV BLD AUTO: 9.4 FL (ref 7.4–10.4)
RBC # BLD AUTO: 4.32 X10(6)/MCL (ref 4.2–5.4)
WBC # SPEC AUTO: 11.3 X10(3)/MCL (ref 4.5–11.5)

## 2024-01-19 PROCEDURE — 93005 ELECTROCARDIOGRAM TRACING: CPT

## 2024-01-19 PROCEDURE — 25000003 PHARM REV CODE 250: Performed by: NURSE ANESTHETIST, CERTIFIED REGISTERED

## 2024-01-19 PROCEDURE — 71000015 HC POSTOP RECOV 1ST HR: Performed by: OTOLARYNGOLOGY

## 2024-01-19 PROCEDURE — 25000003 PHARM REV CODE 250: Performed by: ANESTHESIOLOGY

## 2024-01-19 PROCEDURE — 27201423 OPTIME MED/SURG SUP & DEVICES STERILE SUPPLY: Performed by: OTOLARYNGOLOGY

## 2024-01-19 PROCEDURE — 63600175 PHARM REV CODE 636 W HCPCS: Performed by: ANESTHESIOLOGY

## 2024-01-19 PROCEDURE — D9220A PRA ANESTHESIA: Mod: CRNA,,, | Performed by: NURSE ANESTHETIST, CERTIFIED REGISTERED

## 2024-01-19 PROCEDURE — 37000009 HC ANESTHESIA EA ADD 15 MINS: Performed by: OTOLARYNGOLOGY

## 2024-01-19 PROCEDURE — 63600175 PHARM REV CODE 636 W HCPCS: Performed by: OTOLARYNGOLOGY

## 2024-01-19 PROCEDURE — 25000003 PHARM REV CODE 250: Performed by: OTOLARYNGOLOGY

## 2024-01-19 PROCEDURE — 63600175 PHARM REV CODE 636 W HCPCS: Performed by: STUDENT IN AN ORGANIZED HEALTH CARE EDUCATION/TRAINING PROGRAM

## 2024-01-19 PROCEDURE — 36000711: Performed by: OTOLARYNGOLOGY

## 2024-01-19 PROCEDURE — 63600175 PHARM REV CODE 636 W HCPCS: Performed by: NURSE ANESTHETIST, CERTIFIED REGISTERED

## 2024-01-19 PROCEDURE — 36000710: Performed by: OTOLARYNGOLOGY

## 2024-01-19 PROCEDURE — 37000008 HC ANESTHESIA 1ST 15 MINUTES: Performed by: OTOLARYNGOLOGY

## 2024-01-19 PROCEDURE — D9220A PRA ANESTHESIA: Mod: ANES,,, | Performed by: ANESTHESIOLOGY

## 2024-01-19 PROCEDURE — 71000033 HC RECOVERY, INTIAL HOUR: Performed by: OTOLARYNGOLOGY

## 2024-01-19 PROCEDURE — 81025 URINE PREGNANCY TEST: CPT | Performed by: NURSE PRACTITIONER

## 2024-01-19 PROCEDURE — 85025 COMPLETE CBC W/AUTO DIFF WBC: CPT | Performed by: OTOLARYNGOLOGY

## 2024-01-19 RX ORDER — BACITRACIN ZINC 500 UNIT/G
OINTMENT (GRAM) TOPICAL
Status: DISCONTINUED | OUTPATIENT
Start: 2024-01-19 | End: 2024-01-19 | Stop reason: HOSPADM

## 2024-01-19 RX ORDER — DEXAMETHASONE SODIUM PHOSPHATE 4 MG/ML
INJECTION, SOLUTION INTRA-ARTICULAR; INTRALESIONAL; INTRAMUSCULAR; INTRAVENOUS; SOFT TISSUE
Status: DISCONTINUED | OUTPATIENT
Start: 2024-01-19 | End: 2024-01-19

## 2024-01-19 RX ORDER — LIDOCAINE HYDROCHLORIDE 20 MG/ML
INJECTION INTRAVENOUS
Status: DISCONTINUED | OUTPATIENT
Start: 2024-01-19 | End: 2024-01-19

## 2024-01-19 RX ORDER — LABETALOL HCL 20 MG/4 ML
10 SYRINGE (ML) INTRAVENOUS
Status: DISCONTINUED | OUTPATIENT
Start: 2024-01-19 | End: 2024-01-19 | Stop reason: HOSPADM

## 2024-01-19 RX ORDER — FENTANYL CITRATE 50 UG/ML
INJECTION, SOLUTION INTRAMUSCULAR; INTRAVENOUS
Status: DISCONTINUED | OUTPATIENT
Start: 2024-01-19 | End: 2024-01-19

## 2024-01-19 RX ORDER — ONDANSETRON HYDROCHLORIDE 2 MG/ML
INJECTION, SOLUTION INTRAVENOUS
Status: DISCONTINUED | OUTPATIENT
Start: 2024-01-19 | End: 2024-01-19

## 2024-01-19 RX ORDER — OXYCODONE HYDROCHLORIDE 5 MG/1
5 TABLET ORAL EVERY 6 HOURS PRN
Status: DISCONTINUED | OUTPATIENT
Start: 2024-01-19 | End: 2024-01-19 | Stop reason: HOSPADM

## 2024-01-19 RX ORDER — HYDROMORPHONE HYDROCHLORIDE 1 MG/ML
INJECTION, SOLUTION INTRAMUSCULAR; INTRAVENOUS; SUBCUTANEOUS
Status: DISCONTINUED | OUTPATIENT
Start: 2024-01-19 | End: 2024-01-19

## 2024-01-19 RX ORDER — ROPIVACAINE HYDROCHLORIDE 5 MG/ML
INJECTION, SOLUTION EPIDURAL; INFILTRATION; PERINEURAL
Status: DISCONTINUED | OUTPATIENT
Start: 2024-01-19 | End: 2024-01-19 | Stop reason: HOSPADM

## 2024-01-19 RX ORDER — CEFAZOLIN SODIUM 1 G/3ML
2 INJECTION, POWDER, FOR SOLUTION INTRAMUSCULAR; INTRAVENOUS
Status: DISCONTINUED | OUTPATIENT
Start: 2024-01-19 | End: 2024-01-19

## 2024-01-19 RX ORDER — LIDOCAINE HYDROCHLORIDE AND EPINEPHRINE 10; 10 MG/ML; UG/ML
INJECTION, SOLUTION INFILTRATION; PERINEURAL
Status: DISCONTINUED | OUTPATIENT
Start: 2024-01-19 | End: 2024-01-19 | Stop reason: HOSPADM

## 2024-01-19 RX ORDER — OXYCODONE HYDROCHLORIDE 5 MG/1
5 TABLET ORAL
Status: DISCONTINUED | OUTPATIENT
Start: 2024-01-19 | End: 2024-01-19 | Stop reason: HOSPADM

## 2024-01-19 RX ORDER — MORPHINE SULFATE 2 MG/ML
2 INJECTION, SOLUTION INTRAMUSCULAR; INTRAVENOUS EVERY 5 MIN PRN
Status: DISCONTINUED | OUTPATIENT
Start: 2024-01-19 | End: 2024-01-19 | Stop reason: HOSPADM

## 2024-01-19 RX ORDER — SODIUM CHLORIDE 0.9 % (FLUSH) 0.9 %
10 SYRINGE (ML) INJECTION
Status: DISCONTINUED | OUTPATIENT
Start: 2024-01-19 | End: 2024-01-19 | Stop reason: HOSPADM

## 2024-01-19 RX ORDER — VITAMIN A 3000 MCG
1 CAPSULE ORAL
Qty: 15 ML | Refills: 3 | Status: SHIPPED | OUTPATIENT
Start: 2024-01-19

## 2024-01-19 RX ORDER — LEVOFLOXACIN 5 MG/ML
500 INJECTION, SOLUTION INTRAVENOUS
Status: COMPLETED | OUTPATIENT
Start: 2024-01-19 | End: 2024-01-19

## 2024-01-19 RX ORDER — EPINEPHRINE 1 MG/ML
INJECTION INTRAMUSCULAR; INTRAVENOUS; SUBCUTANEOUS
Status: DISCONTINUED | OUTPATIENT
Start: 2024-01-19 | End: 2024-01-19 | Stop reason: HOSPADM

## 2024-01-19 RX ORDER — MIDAZOLAM HYDROCHLORIDE 1 MG/ML
2 INJECTION INTRAMUSCULAR; INTRAVENOUS ONCE AS NEEDED
Status: COMPLETED | OUTPATIENT
Start: 2024-01-19 | End: 2024-01-19

## 2024-01-19 RX ORDER — LABETALOL HYDROCHLORIDE 5 MG/ML
INJECTION, SOLUTION INTRAVENOUS
Status: DISCONTINUED | OUTPATIENT
Start: 2024-01-19 | End: 2024-01-19

## 2024-01-19 RX ORDER — OXYCODONE HYDROCHLORIDE 5 MG/1
5 TABLET ORAL EVERY 6 HOURS PRN
Qty: 20 TABLET | Refills: 0 | Status: SHIPPED | OUTPATIENT
Start: 2024-01-19 | End: 2024-01-23 | Stop reason: SDUPTHER

## 2024-01-19 RX ORDER — ONDANSETRON HYDROCHLORIDE 2 MG/ML
4 INJECTION, SOLUTION INTRAVENOUS DAILY PRN
Status: DISCONTINUED | OUTPATIENT
Start: 2024-01-19 | End: 2024-01-19 | Stop reason: HOSPADM

## 2024-01-19 RX ORDER — METOCLOPRAMIDE HYDROCHLORIDE 5 MG/ML
10 INJECTION INTRAMUSCULAR; INTRAVENOUS ONCE AS NEEDED
Status: COMPLETED | OUTPATIENT
Start: 2024-01-19 | End: 2024-01-19

## 2024-01-19 RX ORDER — ROCURONIUM BROMIDE 10 MG/ML
INJECTION, SOLUTION INTRAVENOUS
Status: DISCONTINUED | OUTPATIENT
Start: 2024-01-19 | End: 2024-01-19

## 2024-01-19 RX ORDER — OXYMETAZOLINE HCL 0.05 %
SPRAY, NON-AEROSOL (ML) NASAL
Status: DISCONTINUED | OUTPATIENT
Start: 2024-01-19 | End: 2024-01-19 | Stop reason: HOSPADM

## 2024-01-19 RX ORDER — MEPERIDINE HYDROCHLORIDE 25 MG/ML
12.5 INJECTION INTRAMUSCULAR; INTRAVENOUS; SUBCUTANEOUS EVERY 10 MIN PRN
Status: DISCONTINUED | OUTPATIENT
Start: 2024-01-19 | End: 2024-01-19 | Stop reason: HOSPADM

## 2024-01-19 RX ORDER — SODIUM CHLORIDE, SODIUM LACTATE, POTASSIUM CHLORIDE, CALCIUM CHLORIDE 600; 310; 30; 20 MG/100ML; MG/100ML; MG/100ML; MG/100ML
INJECTION, SOLUTION INTRAVENOUS CONTINUOUS
Status: ACTIVE | OUTPATIENT
Start: 2024-01-19

## 2024-01-19 RX ORDER — OXYMETAZOLINE HCL 0.05 %
2 SPRAY, NON-AEROSOL (ML) NASAL 2 TIMES DAILY
Qty: 22 ML | Status: SHIPPED | OUTPATIENT
Start: 2024-01-19 | End: 2024-01-22

## 2024-01-19 RX ORDER — PROPOFOL 10 MG/ML
VIAL (ML) INTRAVENOUS
Status: DISCONTINUED | OUTPATIENT
Start: 2024-01-19 | End: 2024-01-19

## 2024-01-19 RX ORDER — DOXYCYCLINE 100 MG/1
100 CAPSULE ORAL 2 TIMES DAILY
Qty: 20 CAPSULE | Refills: 0 | Status: SHIPPED | OUTPATIENT
Start: 2024-01-19 | End: 2024-01-29

## 2024-01-19 RX ORDER — HYDRALAZINE HYDROCHLORIDE 20 MG/ML
10 INJECTION INTRAMUSCULAR; INTRAVENOUS
Status: DISCONTINUED | OUTPATIENT
Start: 2024-01-19 | End: 2024-01-19 | Stop reason: HOSPADM

## 2024-01-19 RX ADMIN — ROCURONIUM BROMIDE 40 MG: 10 INJECTION INTRAVENOUS at 12:01

## 2024-01-19 RX ADMIN — ROCURONIUM BROMIDE 10 MG: 10 INJECTION INTRAVENOUS at 01:01

## 2024-01-19 RX ADMIN — FENTANYL CITRATE 50 MCG: 50 INJECTION INTRAMUSCULAR; INTRAVENOUS at 01:01

## 2024-01-19 RX ADMIN — HYDROMORPHONE HYDROCHLORIDE 0.25 MG: 1 INJECTION, SOLUTION INTRAMUSCULAR; INTRAVENOUS; SUBCUTANEOUS at 06:01

## 2024-01-19 RX ADMIN — OXYCODONE HYDROCHLORIDE 5 MG: 5 TABLET ORAL at 08:01

## 2024-01-19 RX ADMIN — ONDANSETRON 4 MG: 2 INJECTION INTRAMUSCULAR; INTRAVENOUS at 07:01

## 2024-01-19 RX ADMIN — ROCURONIUM BROMIDE 20 MG: 10 INJECTION INTRAVENOUS at 02:01

## 2024-01-19 RX ADMIN — HYDROMORPHONE HYDROCHLORIDE 0.25 MG: 1 INJECTION, SOLUTION INTRAMUSCULAR; INTRAVENOUS; SUBCUTANEOUS at 03:01

## 2024-01-19 RX ADMIN — MIDAZOLAM HYDROCHLORIDE 2 MG: 1 INJECTION, SOLUTION INTRAMUSCULAR; INTRAVENOUS at 12:01

## 2024-01-19 RX ADMIN — DEXAMETHASONE SODIUM PHOSPHATE 8 MG: 4 INJECTION, SOLUTION INTRA-ARTICULAR; INTRALESIONAL; INTRAMUSCULAR; INTRAVENOUS; SOFT TISSUE at 01:01

## 2024-01-19 RX ADMIN — SODIUM CHLORIDE, POTASSIUM CHLORIDE, SODIUM LACTATE AND CALCIUM CHLORIDE: 600; 310; 30; 20 INJECTION, SOLUTION INTRAVENOUS at 12:01

## 2024-01-19 RX ADMIN — LABETALOL HYDROCHLORIDE 2.5 MG: 5 INJECTION INTRAVENOUS at 02:01

## 2024-01-19 RX ADMIN — LIDOCAINE HYDROCHLORIDE 50 MG: 20 INJECTION INTRAVENOUS at 12:01

## 2024-01-19 RX ADMIN — PROPOFOL 50 MG: 10 INJECTION, EMULSION INTRAVENOUS at 12:01

## 2024-01-19 RX ADMIN — LEVOFLOXACIN 500 MG: 500 INJECTION, SOLUTION INTRAVENOUS at 01:01

## 2024-01-19 RX ADMIN — ONDANSETRON 4 MG: 2 INJECTION INTRAMUSCULAR; INTRAVENOUS at 06:01

## 2024-01-19 RX ADMIN — METOCLOPRAMIDE 10 MG: 5 INJECTION, SOLUTION INTRAMUSCULAR; INTRAVENOUS at 08:01

## 2024-01-19 RX ADMIN — LIDOCAINE HYDROCHLORIDE 100 MG: 20 INJECTION INTRAVENOUS at 06:01

## 2024-01-19 RX ADMIN — HYDROMORPHONE HYDROCHLORIDE 0.25 MG: 1 INJECTION, SOLUTION INTRAMUSCULAR; INTRAVENOUS; SUBCUTANEOUS at 04:01

## 2024-01-19 RX ADMIN — FENTANYL CITRATE 50 MCG: 50 INJECTION INTRAMUSCULAR; INTRAVENOUS at 12:01

## 2024-01-19 NOTE — DISCHARGE INSTRUCTIONS
= Keep follow up appointment at the [facility].  Resume home medications unless otherwise instructed by your doctor.    · No heavy lifting or straining for 2 weeks.    · You may take a shower tomorrow.    · You have dissolvable packing in your nose.  Use saline nasal spray every 2 hours while awake and as needed to keep moist. You will likely have some bleeding from your nose. If small amount of blood is oozing from your nose, spray over the counter Afrin in each nostril.  Call clinic immediately if bleeding does not stop or if it is a large amount running down throat or out nose AND go to ER.    ` You have a splint across the bridge of your nose.  Keep this in place, clean and dry until clinic follow up visit.    · Do NOT blow your nose, sneeze through mouth only. Call clinic immediately with any nausea/vomiting or go to ER.    · You may alternate Ibuprofen and Tylenol every 4-6 hours as needed for pain or discomfort.  If you are experiencing severe pain even with your pain medications, please call the ENT clinic at [phone] to notify your doctor.    · You may use an ice pack as needed for 20 minutes at a time over your cheeks/forehead to minimize swelling and help relieve pain.      `  Sleep with head of bed elevated (using pillows if necessary) to help with pain and minimize swelling.    · Do not drink alcohol or drive today, or as long as you are on pain medication.    · Notify MD of any moderate to severe pain unrelieved by pain medicine, if you have continuous or severe bleeding, or for any signs of infection including fever above 100.4, excessive redness or swelling, yellow/green foul- smelling drainage, nausea or vomiting. Clinics number is [phone]. If it is after business hours or emergency call [phone] and state Im having post op complications and need to speak to the ENT surgeon on call.    · Thanks for choosing [facility]! Have a smooth recovery!

## 2024-01-19 NOTE — ANESTHESIA PROCEDURE NOTES
Intubation    Date/Time: 1/19/2024 12:58 PM    Performed by: Carmita Urrutia CRNA  Authorized by: Linnette Lopez MD    Intubation:     Induction:  Intravenous    Intubated:  Postinduction    Mask Ventilation:  Easy with oral airway    Attempts:  1    Attempted By:  CRNA    Method of Intubation:  Direct    Blade:  Zapata 2    Laryngeal View Grade: Grade I - full view of cords      Difficult Airway Encountered?: No      Complications:  None    Airway Device:  Oral endotracheal tube    Airway Device Size:  7.5    Style/Cuff Inflation:  Cuffed (inflated to minimal occlusive pressure)    Inflation Amount (mL):  5    Tube secured:  21    Secured at:  The teeth    Placement Verified By:  Capnometry    Complicating Factors:  None    Findings Post-Intubation:  BS equal bilateral and atraumatic/condition of teeth unchanged  Notes:      CUFF PRESSURE 28 CM H2O

## 2024-01-19 NOTE — INTERVAL H&P NOTE
The patient has been examined and the H&P has been reviewed:    I concur with the findings and no changes have occurred since H&P was written.    Surgery risks, benefits and alternative options discussed and understood by patient/family.    SNOT-22 score today: 42    Timothy Drummond MD  [employer] Otolaryngology PGY-4  01/19/2024 10:21 AM

## 2024-01-20 NOTE — TRANSFER OF CARE
Anesthesia Transfer of Care Note    Patient: Julia Senior    Procedure(s) Performed: Procedure(s) (LRB):  FESS (FUNCTIONAL ENDOSCOPIC SINUS SURGERY) (Bilateral)  SEPTOPLASTY, NOSE, WITH NASAL TURBINATE REDUCTION (Bilateral)    Patient location: PACU    Anesthesia Type: general    Transport from OR: Transported from OR on room air with adequate spontaneous ventilation    Post pain: adequate analgesia    Post assessment: no apparent anesthetic complications and tolerated procedure well    Post vital signs: stable    Level of consciousness: sedated    Nausea/Vomiting: no nausea/vomiting    Complications: none    Transfer of care protocol was followedComments: Repot to Riaz CARRIZALES      Last vitals: Visit Vitals  /78   Pulse 68   Temp 36 °C (96.8 °F) (Temporal)   Resp 20   Wt 97.5 kg (215 lb)   LMP 08/01/2023 (Approximate)   SpO2 100%   Breastfeeding No   BMI 33.67 kg/m²

## 2024-01-20 NOTE — OP NOTE
[facility] Otolaryngology Operative Report:    Patient Name: Julia Senior  Date of Admission: 1/19/2024  YOB: 1995  Date of procedure: 01/20/2024    Attending Surgeon: Roberto Alonzo MD    Resident Surgeon(s):   Timothy Drummond MD, HO-IV  Renee Colunga MD, HO-III    Pre-operative diagnosis:  1. Chronic rhinosinusitis without nasal polyposis  2. Nasal obstruction  3. Deviated nasal septum  4. Inferior turbinate hypertrophy  5. Bella bullosa of middle turbinate bilaterally  6. Allergic rhinitis      Post-operative diagnosis:   Same    Procedure(s) Performed:   1. Bilateral frontal sinusotomy with removal of contents (CPT 33686)  2. Bilateral total sphenoethmoidectomy with removal of contents (CPT 69968)  3. Bilateral maxillary antrostomy with removal of contents (CPT 09999)  4. Endonasal septoplasty (CPT 59208)  5. Bilateral reduction of inferior turbinates with therapeutic outfracture (CPT 40598)  6. Resection of bella bullosa of middle turbinate bilaterally  7. Stereotactic Computerized Tomography Image Guidance, extradural (CPT 64466)    Anesthesia: General endotracheal anesthesia    Blood Loss: 100mL    Implants: Silastic splints placed in bilateral frontal sinus outflow tracts. Bilateral Mccarty splints. Dissolvable nasopore placed in bilateral sphenoethmoidal cavities    Drains: None    Specimens: None    Complications: None    Findings:  Bilateral chronic sinusitis changes with edematous mucosa bilaterally. No polyposis.   Deviated nasal septum to the right with bony spur of right maxillary crest with contact point on right inferior turbinate.   Bilateral inferior turbinate hypertrophy    Indications for Surgery:   Julia Senior is a 28 y.o. patient with a long-standing history of chronic rhinosinusitis with refractory symptoms, as well as a deviated nasal septum to the right with a bony spur contacting her right inferior turbinate. She has maximized medical therapy to the best of her ability  without any significant improvement. We have reviewed the treatment options of observation, continued medical therapy, versus surgery in great detail. We have extensively discussed the risks, benefits and possible complications of endoscopic surgery and informed consent was obtained.  After careful consideration, she is ready for us to proceed today.     Procedure in Detail:   After appropriate witnessed informed consent was obtained, the patient was taken down to the operating room and laid in the supine position. General endotracheal anesthesia then commenced without complications.    The bed was then rotated 90 degrees and the patient was appropriately positioned for surgery. The nasal cavity was prepared with epinephrine and ropivacaine soaked cottonoid pledgets. Stereotactic CT guided navigation system was then set up and found to be functioning appropriately in all 3 planes. It should be noted that image guidance was used throughout the entire operation for identification of key surrounding neurovascular structures. The patient was then prepped and draped out in the usual fashion. Before proceeding any further, a full timeout was performed identifying the correct patient and the operative site.     Next, the 0-degree endoscope was used to visualize the left nasal cavity. The inferior turbinate was outfractured to provide from improved access to the middle meatus and ethmoid cavity. First, in order to gain better access to the middle meatus, the bella bullosa was addressed. A skyler was used to enter into the bella bullosa and the microdebrider was used to take down the lateral aspect of the middle turbinate, taking care to leave a healthy strut medially. The uncinate was then taken down with the backbiter and the microdebrider. The maxillary sinus was then entered. The uncinate was completely removed along the horizontal and the vertical portion. After the sinus was opened up widely, tissue was removed and  the cavity was copiously irrigated. An angled endoscope was used to confirm the connection to the natural maxillary sinus ostium, which was widened circumferentially.     The left ethmoids were addressed next. The ethmoid bulla was first addressed with Kerrison and through cutting instrumentation, and the remainder of the anterior ethmoid partitions were carefully removed with a combination of sharp instrumentation and microdebrider. The basal lamella was then entered low and medial, entering the posterior ethmoid compartment.  All anterior and posterior ethmoid air cells were completely removed. The skull base and lamina papyracea were confirmed with the assistance image guidance. The anterior and posterior ethmoid cells were meticulously taken off the skull base as well as the lamina papyracea.     Next, the left superior turbinate was cut sharply. The sphenoid os was then entered and the face of sphenoid was down fractured. The anterior face of the sphenoid was then taken down with Kerrison rongeurs superiorly toward the cranial base and laterally toward the orbital apex. Polypoid changes were noted and removed from the sphenoid os.  The skull base was identified within the sphenoid sinus. This was connected to the posterior ethmoid air cells.     The skull base was then followed anteriorly. The left frontal sinus was then entered. Angled instrumentation and an angled microdebrider was then used to open the natural frontal sinus outflow tract circumferentially.  The frontal sinus was noted to be widely patent.      Next, the 0-degree endoscope was used to visualize the right nasal cavity. The inferior turbinate was outfractured to provide from improved access to the middle meatus and ethmoid cavity. First, in order to gain better access to the middle meatus, the bella bullosa was addressed. A skyler was used to enter into the bella bullosa and the microdebrider was used to take down the lateral aspect of the  middle turbinate, taking care to leave a healthy strut medially. The uncinate was then taken down with the backbiter and the microdebrider. The maxillary sinus was then entered. The uncinate was completely removed along the horizontal and the vertical portion. After the sinus was opened up widely, tissue was removed and the cavity was copiously irrigated. An angled endoscope was used to confirm the connection to the natural maxillary sinus ostium, which was widened circumferentially.     The right ethmoids were addressed next. The ethmoid bulla was first addressed with Kerrison and through cutting instrumentation, and the remainder of the anterior ethmoid partitions were carefully removed with a combination of sharp instrumentation and microdebrider. The basal lamella was then entered low and medial, entering the posterior ethmoid compartment.  All anterior and posterior ethmoid air cells were completely removed. The skull base and lamina papyracea were confirmed with the assistance image guidance. The anterior and posterior ethmoid cells were meticulously taken off the skull base as well as the lamina papyracea.     Next, the right superior turbinate was cut sharply. The sphenoid os was then entered and the face of sphenoid was down fractured. The anterior face of the sphenoid was then taken down with Kerrison rongeurs superiorly toward the cranial base and laterally toward the orbital apex. Polypoid changes were noted and removed from the sphenoid os.  The skull base was identified within the sphenoid sinus. This was connected to the posterior ethmoid air cells.     The skull base was then followed anteriorly. The right frontal sinus was then entered. Angled instrumentation and an angled microdebrider was then used to open the natural frontal sinus outflow tract circumferentially.  The frontal sinus was noted to be widely patent.      Next, attention was turned to the nasal septum. A right sided callum-transfixion  incision was made with a 15 blade. Sub-perichondrial and sub-periosteal flaps were elevated off the cartilaginous and bony septum bilaterally. The cartilaginous septal cuts were made, taking care to leave a 1.5cm caudal and dorsal strut. The septal cartilage was removed. A skyler was used to remove any remaining cartilage from the maxillary crest. Deviated ethmoid bone and maxillary crest bony was then removed sharply. The nose was then irrigated with sterile saline. The callum-transfixion incision was closed with interrupted 4-0 chromic suture. The mucoperichondrial flaps were reapproximated with the same 4-0 plain gut suture in a mattress fashion.  Mccarty nasal septal splints were placed and secured with a 2-0 prolene suture.    The bilateral inferior turbinates were then addressed. A straight hemostat was used to clamp the inferior turbinate as it transitioned from the vertical segment to the horizontal segemnt for 1 minute. Then, straight scissors were used to sharply remove the inferior aspect of the inferior turbinate. The turbinate was then outfractured using a Boies elevator. Hemostasis was achieved with Bipolar electrocautery. This created widely patent airways bilaterally.      Next, the nasal cavities were irrigated and hemostasis was achieved. A silastic sheeting stent was placed in the frontal recesses bilaterally, and absorbable sponge was placed within the sphenoethmoid cavity. The eyes were irrigated with BSS and the patient was then suctioned with an orogastric tube. The patient was then turned back to the anesthesia team in stable condition. All counts were correct at the conclusion of the operation. She tolerated the procedure very well and without complication.     Timothy Drummond MD  [employer] Otolaryngology PGY-4  01/20/2024 3:52 PM

## 2024-01-20 NOTE — TRANSFER OF CARE
Anesthesia Transfer of Care Note    Patient: Julia Senior    Procedure(s) Performed: Procedure(s) (LRB):  FESS (FUNCTIONAL ENDOSCOPIC SINUS SURGERY) (Bilateral)  SEPTOPLASTY, NOSE, WITH NASAL TURBINATE REDUCTION (Bilateral)    Patient location: PACU    Anesthesia Type: general    Transport from OR: Transported from OR on 6-10 L/min O2 by face mask with adequate spontaneous ventilation    Post pain: adequate analgesia    Post assessment: no apparent anesthetic complications and tolerated procedure well    Post vital signs: stable    Level of consciousness: sedated    Nausea/Vomiting: no nausea/vomiting    Complications: none    Transfer of care protocol was followed      Last vitals: Visit Vitals  /78   Pulse 68   Temp 36 °C (96.8 °F) (Temporal)   Resp 20   Wt 97.5 kg (215 lb)   LMP 08/01/2023 (Approximate)   SpO2 100%   Breastfeeding No   BMI 33.67 kg/m²

## 2024-01-20 NOTE — ANESTHESIA POSTPROCEDURE EVALUATION
Anesthesia Post Evaluation    Patient: Julia Senior    Procedure(s) Performed: Procedure(s) (LRB):  FESS (FUNCTIONAL ENDOSCOPIC SINUS SURGERY) (Bilateral)  SEPTOPLASTY, NOSE, WITH NASAL TURBINATE REDUCTION (Bilateral)    Final Anesthesia Type: general      Patient location during evaluation: PACU  Patient participation: Yes- Able to Participate  Level of consciousness: awake and alert  Post-procedure vital signs: reviewed and stable  Pain management: adequate  Airway patency: patent    PONV status at discharge: No PONV  Anesthetic complications: no      Respiratory status: unassisted  Hydration status: euvolemic  Follow-up not needed.          Delayed emergence in recovery  Sleepy, but easily arousable  Mild hypertension, prns available    Vitals Value Taken Time   /81 01/19/24 1930   Temp 36.9 °C (98.4 °F) 01/19/24 1900   Pulse 100 01/19/24 1930   Resp 20 01/19/24 1930   SpO2 95 % 01/19/24 1930         No case tracking events are documented in the log.      Pain/Grisel Score: Grisel Score: 4 (1/19/2024  7:00 PM)

## 2024-01-20 NOTE — BRIEF OP NOTE
[facility] - Periop Services  Brief Operative Note    Surgery Date: 1/19/2024     Surgeon(s) and Role:     * Roberto Alonzo MD - Primary     * Timothy Drummond MD - Assisting     * Yamilet Pappas MD - Resident - Assisting     * Renee Colunga MD - Resident - Assisting        Pre-op Diagnosis:  * No pre-op diagnosis entered *    Post-op Diagnosis:  Post-Op Diagnosis Codes:     * Chronic pansinusitis [J32.4]     * Nasal septal deviation [J34.2]     * Nasal turbinate hypertrophy [J34.3]    Procedure(s) (LRB):  FESS (FUNCTIONAL ENDOSCOPIC SINUS SURGERY) (Bilateral)  SEPTOPLASTY, NOSE, WITH NASAL TURBINATE REDUCTION (Bilateral)    Anesthesia: General    Operative Findings: Bilateral bella bullosa of middle turbinates. Bilateral chronic rhinosinusitis. Complete FESS performed bilaterally. Nasal septal deviation to the right, both of bony and cartilaginous septum. Bony spur of maxillary crest to the right, which was removed.     Packing/Implants: Nasopore dissolvable packing; silastic stents in frontal sinuses bilaterally. Mccarty splints bilaterally.     Estimated Blood Loss: 100 mL         Specimens:   Specimen (24h ago, onward)      None              Discharge Note    OUTCOME: Patient tolerated treatment/procedure well without complication and is now ready for discharge.    DISPOSITION: Home or Self Care    FINAL DIAGNOSIS: Chronic rhinosinusitis, nasal obstruction    FOLLOWUP: In clinic    DISCHARGE INSTRUCTIONS:    Discharge Procedure Orders   Diet Adult Regular     Change dressing (specify)   Order Comments: Dressing change: can change a small piece of gauze under nose as needed for bleeding     Activity as tolerated        Clinical Reference Documents Added to Patient Instructions         Document    ENDOSCOPIC SINUS SURGERY DISCHARGE INSTRUCTIONS (ENGLISH)

## 2024-01-22 PROBLEM — J34.2 NASAL SEPTAL DEVIATION: Status: ACTIVE | Noted: 2024-01-22

## 2024-01-22 PROBLEM — J34.3 NASAL TURBINATE HYPERTROPHY: Status: ACTIVE | Noted: 2024-01-22

## 2024-01-22 PROBLEM — J32.4 CHRONIC PANSINUSITIS: Status: ACTIVE | Noted: 2024-01-22

## 2024-01-23 RX ORDER — OXYCODONE HYDROCHLORIDE 5 MG/1
5 TABLET ORAL EVERY 6 HOURS PRN
Qty: 20 TABLET | Refills: 0 | Status: SHIPPED | OUTPATIENT
Start: 2024-01-23

## 2024-01-25 ENCOUNTER — OFFICE VISIT (OUTPATIENT)
Dept: OTOLARYNGOLOGY | Facility: CLINIC | Age: 29
End: 2024-01-25
Payer: MEDICAID

## 2024-01-25 VITALS
RESPIRATION RATE: 18 BRPM | HEART RATE: 81 BPM | BODY MASS INDEX: 33.74 KG/M2 | SYSTOLIC BLOOD PRESSURE: 127 MMHG | DIASTOLIC BLOOD PRESSURE: 75 MMHG | WEIGHT: 215 LBS | HEIGHT: 67 IN

## 2024-01-25 DIAGNOSIS — Z98.890 S/P NASAL SEPTOPLASTY: ICD-10-CM

## 2024-01-25 DIAGNOSIS — J34.3 NASAL TURBINATE HYPERTROPHY: ICD-10-CM

## 2024-01-25 DIAGNOSIS — J34.2 NASAL SEPTAL DEVIATION: ICD-10-CM

## 2024-01-25 DIAGNOSIS — J32.4 CHRONIC PANSINUSITIS: Primary | ICD-10-CM

## 2024-01-25 DIAGNOSIS — Z98.890 S/P SINUS SURGERY: ICD-10-CM

## 2024-01-25 PROCEDURE — 99214 OFFICE O/P EST MOD 30 MIN: CPT | Mod: PBBFAC | Performed by: OTOLARYNGOLOGY

## 2024-01-25 NOTE — PROGRESS NOTES
Patient Name: Julia Senior   YOB: 1995     Chief Complaint: follow-up for chronic sinusitis       History of Present Illness:  October 19, 2023:   Julia Senior is a 28 y.o. female with chronic episodic post nasal drip and sore throat for 15 years. She complains of hoarseness and difficulty breathing. She broke her nose at 13 years old without surgical reconstruction, and since then she has sinus congestion every 5 weeks for around 8 days regardless of season. She has been given antibiotics nearly every episode for years but does not find that they help relieve her symptoms or shorten the course. She has tried Flonase without resolution of symptoms. She uses an electric sinus flushing apparatus twice a day during symptomatic episodes. She denies symptoms of GERD - heartburn, reflux, belching. She works as a  and constantly has to take off of work for these issues. She has a past medical history of Pott's syndrome, symptomatic hypoglycemia, eczema, and migraines. She is not currently having sinus infection symptoms.     November 22, 2023:   Patient presents today for follow-up of her chronic sinusitis.  In reviewing the patient's history she would indicated that she is had problems with her nose and sinuses which had developed after a nasal fracture at the age of 13 which had not been repaired.  Since that time she is had problems with perennial nasal congestion, postnasal drainage, associated cough which is occasionally productive, sore throat, and facial pressure.  This tends to be worse in the spring and the fall and is also exacerbated by exposure to cigarette smoke and also with weather changes.  Other symptoms include malaise.  Frequently her nasal drainage we will be discolored.  She has been treated with antibiotics on multiple occasions.  She is usually treated 3-4 times per year and possibly more.  In 1 year previously she had been treated 9 times with antibiotics.  She is not  been treated with antibiotics in the recent past.  She has been treated with various nasal sprays in the past and had not found any long-lasting relief.  She is currently on fluticasone nasal spray which she uses routinely.  She is also on saline irrigations 1-2 times daily.  She has also been treated with azelastine in the past but then had only cause nasal burning.  She does not find much relief with these nasal sprays.  She does have a history of eczema for which she takes cetirizine.  This cetirizine is not helpful with her nasal or sinus symptoms. She does not have problems with paroxysms of sneezing or ocular itching.  Patient does have a history of some food allergies.  Never been on immunotherapy.  Patient did not have her ImmunoCAP allergy assay done as of yet.  Patient is a nonsmoker.  She works as a .    January 3, 2024:   Patient presents today for follow-up of her chronic rhinosinusitis.  She completed the doxycycline that was prescribed at her last visit.  She also continues to take fluticasone nasal spray, saline irrigations, and azelastine nasal spray.  She has not really had any change in her symptoms.    Review of her CT scan of the sinuses done on December 18, 2023, shows the presence of mucosal edema involving both frontal, both ethmoid, and both maxillary sinuses with obstruction of the ostiomeatal complex.  There is a small amount of mucosal edema within the left sphenoid sinus.  She does appear to have some mucosal edema obstructing the sphenoid sinus ostia bilaterally.  She also has right septal deviation and right inferior turbinate hypertrophy.  ImmunoCAP allergy assay showed her to have a normal IgE level.  She did have elevated titers to ragweed, marsh elder, dust mite, cockroach, and [place]uda grass.  Results were discussed with the patient and her .  A new problem today is that of a several day history of some tender swelling in her right upper neck.  This is associated  with some pain in the area of her right most posterior mandibular molar.  No fever sore throat.  She has not had any treatment.    1/19/24:  Full house Fess with silastic for frontal sinus, septoplasty and inferior turbinate reduction    1/25/24:  Presents for 1st postop.  States she has had congestion and pain from the splints.  Has been using ocean spray occasionally.  She did not  her doxycycline due to the ice she has the pharmacy was closed.  Has been taking pain medicine as needed.  Minimal epistaxis.    Past Medical History:  Past Medical History:   Diagnosis Date    POTS (postural orthostatic tachycardia syndrome)     No problems for several years     Past Surgical History:   Procedure Laterality Date    adenoids      FUNCTIONAL ENDOSCOPIC SINUS SURGERY (FESS) USING COMPUTER-ASSISTED NAVIGATION Bilateral 1/19/2024    Procedure: FESS, USING COMPUTER-ASSISTED NAVIGATION;  Surgeon: Roberto Alonzo MD;  Location: [facility];  Service: ENT;  Laterality: Bilateral;    SEPTOPLASTY, NOSE, WITH NASAL TURBINATE REDUCTION Bilateral 1/19/2024    Procedure: SEPTOPLASTY, NOSE, WITH NASAL TURBINATE REDUCTION;  Surgeon: Roberto Alonzo MD;  Location: [facility];  Service: ENT;  Laterality: Bilateral;       Social History:  Social History     Socioeconomic History    Marital status:    Tobacco Use    Smoking status: Never    Smokeless tobacco: Never   Substance and Sexual Activity    Alcohol use: Yes     Comment: occ    Drug use: Not Currently    Sexual activity: Yes     Partners: Male   Social History Narrative    ** Merged History Encounter **            Review of Systems:  Unremarkable except as mentioned above.    Current Medications:  Current Outpatient Medications   Medication Sig    aripiprazole (ABILIFY) 10 MG disintegrating tablet Take 10 mg by mouth once.    azelastine (ASTELIN) 137 mcg (0.1 %) nasal spray 1 spray (137 mcg total) by Nasal route 2 (two) times daily.    baclofen (LIORESAL) 10 MG tablet  Take 1 tablet (10 mg total) by mouth 3 (three) times daily. for 10 days    cetirizine (ZYRTEC) 10 MG tablet Take 10 mg by mouth.    divalproex (DEPAKOTE) 500 MG TbEC Take 500 mg by mouth 2 (two) times daily.    doxycycline (VIBRAMYCIN) 100 MG Cap Take 1 capsule (100 mg total) by mouth 2 (two) times daily. for 10 days    famotidine (PEPCID) 20 MG tablet Take 20 mg by mouth.    fluticasone propionate (FLONASE) 50 mcg/actuation nasal spray 2 sprays (100 mcg total) by Each Nostril route once daily.    lithium (LITHOTAB) 300 mg tablet Take 300 mg by mouth 2 (two) times daily with meals.    melatonin 3 mg Tab Take 3 mg by mouth once daily.    naproxen (NAPROSYN) 500 MG tablet Take 1 tablet (500 mg total) by mouth 2 (two) times daily with meals. (Patient not taking: Reported on 11/22/2023)    norethindrone-e.estradiol-iron 1 mg-20 mcg(24) /75 mg (4) Chew Take 1 mg by mouth once daily.    oxyCODONE (ROXICODONE) 5 MG immediate release tablet Take 1 tablet (5 mg total) by mouth every 6 (six) hours as needed for Pain.    paroxetine (PAXIL) 10 MG tablet Take 1 tablet by mouth once daily.    quetiapine (SEROQUEL) 100 MG Tab Take 100 mg by mouth every evening.    risperiDONE (RISPERDAL) 1 MG tablet Take 1 mg by mouth 2 (two) times daily.    sodium chloride (SALINE NASAL) 0.65 % nasal spray 1 spray by Nasal route as needed for Congestion.    tiZANidine (ZANAFLEX) 4 MG tablet Take 4 mg by mouth.    topiramate (TOPAMAX) 50 MG tablet Take 50 mg by mouth 2 (two) times daily.    valACYclovir (VALTREX) 500 MG tablet Take 500 mg by mouth 2 (two) times daily.    venlafaxine (EFFEXOR-XR) 37.5 MG 24 hr capsule Take 37.5 mg by mouth once daily.     No current facility-administered medications for this visit.     Facility-Administered Medications Ordered in Other Visits   Medication    lactated ringers infusion        Allergies:  Review of patient's allergies indicates:   Allergen Reactions    Amoxil [amoxicillin] Hives    Lamotrigine Hives  "   Penicillins Hives    Sulfa (sulfonamide antibiotics) Hives     bactrum    Amoxicillin Hives    Clindamycin Hives    Hydroxyzine Hallucinations    Ibuprofen Other (See Comments)    Norelgestromin-ethin.estradiol Hives    Penicillin Hives    Pseudoephedrine hcl Hives    Sulfamethoxazole-trimethoprim         Family History:  No family history on file.    Physical Exam:  Vital signs:   Vitals:    01/25/24 1350   BP: 127/75   BP Location: Left arm   Patient Position: Sitting   BP Method: Medium (Automatic)   Pulse: 81   Resp: 18   Weight: 97.5 kg (215 lb)   Height: 5' 7" (1.702 m)   General:  Well-developed well-nourished female in no acute distress.  She does have a slight hyponasal quality to her voice.  Head and face:  Normocephalic.  No facial lesions.  No temporomandibular joint tenderness or click.  Ears:  Right ear-auricle is normally developed.  External auditory canal is clear.  Tympanic membrane is nonerythematous.  No middle ear effusion.  Left ear-auricle is normally developed.  External auditory canal is clear.  Tympanic membrane is nonerythematous.  No middle ear effusion.  Nose:  Mccarty splints removed, septal mucosa appears to be well healing with no evidence of perforations.  Nasal cavity was suctioned until tolerated.  Partial and NasoPore were removed bilaterally.  Minimal epistaxis  Neck:  Supple without thyromegaly.  She does have mildly tender right level 2 lymphadenopathy.  Trachea is in the midline.  Parotid and submandibular glands are normal to palpation without masses or tenderness.  Eyes:  Extraocular muscles intact.  No nystagmus.  No exophthalmos or enophthalmos.  Neurologic:  Alert and oriented.  Cranial nerves 2-12 are grossly normal.     Procedure:  Nasal debridement:  First, using forceps and scissors the nylon stitch holding the mccarty splints was released. Next the mccarty splints were removed from bilateral nasal cavity. Next using a 7Fr suction, bilateral nasal cavities were suctioned " adequately. Minimal nasopore was left within the nasal cavity. Patient tolerated the procedure with mild nausea and dizziness which resolved.     Assessment/Plan:  Chronic pansinusitis, septal deviation, turbinate hypertrophy.    Allergic rhinitis.  Odontogenic infection (tooth 2.)     Plan:  28-year-old female found had significant chronic rhinosinusitis that had pansinusitis on CT also with significant right-sided septal deviation and mild inferior turbinate hypertrophy.  Underwent full house Fess with silastic for frontal sinuses, septoplasty and inferior turbinate reduction on January 19, 2024.  Patient presented today for 1st postop visit.  Two splints were removed and NasoPore was suctioned from nasal cavity.  Overall standard postop healing for only 1 week out.  Rinse bottle given to start saline irrigations.  Did not  doxycycline postoperatively so we prescribed today.  Can also use Afrin for any nosebleed in after splint removal.  We will have patient return in 1 week for removal of silastic.    -return to clinic in 1 week for silastic removal    Yamilet Pappas MD  [facility] Department of Otolaryngology  [place]

## 2024-02-01 ENCOUNTER — OFFICE VISIT (OUTPATIENT)
Dept: OTOLARYNGOLOGY | Facility: CLINIC | Age: 29
End: 2024-02-01
Payer: MEDICAID

## 2024-02-01 ENCOUNTER — TELEPHONE (OUTPATIENT)
Dept: OTOLARYNGOLOGY | Facility: CLINIC | Age: 29
End: 2024-02-01
Payer: MEDICAID

## 2024-02-01 VITALS
SYSTOLIC BLOOD PRESSURE: 108 MMHG | RESPIRATION RATE: 20 BRPM | HEART RATE: 86 BPM | DIASTOLIC BLOOD PRESSURE: 67 MMHG | WEIGHT: 215 LBS | BODY MASS INDEX: 33.74 KG/M2 | HEIGHT: 67 IN

## 2024-02-01 DIAGNOSIS — J32.4 CHRONIC PANSINUSITIS: Primary | ICD-10-CM

## 2024-02-01 DIAGNOSIS — Z98.890 HISTORY OF ENDOSCOPIC SINUS SURGERY: ICD-10-CM

## 2024-02-01 DIAGNOSIS — J34.3 NASAL TURBINATE HYPERTROPHY: ICD-10-CM

## 2024-02-01 PROCEDURE — 99215 OFFICE O/P EST HI 40 MIN: CPT | Mod: PBBFAC | Performed by: STUDENT IN AN ORGANIZED HEALTH CARE EDUCATION/TRAINING PROGRAM

## 2024-02-01 RX ORDER — LIDOCAINE HYDROCHLORIDE 40 MG/ML
1 INJECTION, SOLUTION RETROBULBAR
Status: SHIPPED | OUTPATIENT
Start: 2024-02-01

## 2024-02-01 NOTE — PROGRESS NOTES
The scope used for the exam was:  Flexible scope ENF-P4  Serial Number:  1)    [id number]    []   2)    [id number]    []   3)    [id number]    []   4)    [id number]    []   5)    [id number]    []   6)    [id number]    []       The scope used for the exam was:  Rigid scope   Serial Number:  1)   [id number]    []   2)   [id number]    []   3)   7330    []   4)    3384   []   5)    0824   []   6)    5554   []     7)   7425    []   8)   2240    []   9)   1109    []   The scope used for the exam was:  Flexible scope ENF-P4  Serial Number:  1)    [id number]    []   2)    [id number]    []   3)    [id number]    []   4)    [id number]    []   5)    [id number]    []   6)    [id number]    []       The scope used for the exam was:  Rigid scope   Serial Number:  1)   [id number]    []   2)   6282    []   3)   7330    []   4)    3384   []   5)    0824   []   6)    5554   []     7)   7425    [x]   8)   2240    []   9)   1109    []

## 2024-02-01 NOTE — TELEPHONE ENCOUNTER
Spoke with patient today regarding headaches. She feels like headaches are getting progressively worse and not responding to tylenol, ibuprofen, or oxycodone. They are sometimes behind her eyes and sometimes spread to the top of her head. Occasionally having chills but no fevers. Sometimes gets dizzy when walking and feels like she is not drinking as much as she usually does. Has been using saline irrigations and still getting some chunks out.     I encouraged her to come in today so that we could see her and make sure everything still looks ok with her sinuses. She is in agreement.     Timothy Drummond MD  [facility] PGY-4  02/01/2024 11:44 AM

## 2024-02-02 NOTE — PROGRESS NOTES
[facility] Ear, Nose, and Throat Clinic Note  Patient Name: Julia Senior   YOB: 1995     Chief Complaint: follow-up for chronic sinusitis     History of Present Illness:  October 19, 2023:   Julia Senior is a 28 y.o. female with chronic episodic post nasal drip and sore throat for 15 years. She complains of hoarseness and difficulty breathing. She broke her nose at 13 years old without surgical reconstruction, and since then she has sinus congestion every 5 weeks for around 8 days regardless of season. She has been given antibiotics nearly every episode for years but does not find that they help relieve her symptoms or shorten the course. She has tried Flonase without resolution of symptoms. She uses an electric sinus flushing apparatus twice a day during symptomatic episodes. She denies symptoms of GERD - heartburn, reflux, belching. She works as a  and constantly has to take off of work for these issues. She has a past medical history of Pott's syndrome, symptomatic hypoglycemia, eczema, and migraines. She is not currently having sinus infection symptoms.     November 22, 2023:   Patient presents today for follow-up of her chronic sinusitis.  In reviewing the patient's history she would indicated that she is had problems with her nose and sinuses which had developed after a nasal fracture at the age of 13 which had not been repaired.  Since that time she is had problems with perennial nasal congestion, postnasal drainage, associated cough which is occasionally productive, sore throat, and facial pressure.  This tends to be worse in the spring and the fall and is also exacerbated by exposure to cigarette smoke and also with weather changes.  Other symptoms include malaise.  Frequently her nasal drainage we will be discolored.  She has been treated with antibiotics on multiple occasions.  She is usually treated 3-4 times per year and possibly more.  In 1 year previously she had been  treated 9 times with antibiotics.  She is not been treated with antibiotics in the recent past.  She has been treated with various nasal sprays in the past and had not found any long-lasting relief.  She is currently on fluticasone nasal spray which she uses routinely.  She is also on saline irrigations 1-2 times daily.  She has also been treated with azelastine in the past but then had only cause nasal burning.  She does not find much relief with these nasal sprays.  She does have a history of eczema for which she takes cetirizine.  This cetirizine is not helpful with her nasal or sinus symptoms. She does not have problems with paroxysms of sneezing or ocular itching.  Patient does have a history of some food allergies.  Never been on immunotherapy.  Patient did not have her ImmunoCAP allergy assay done as of yet.  Patient is a nonsmoker.  She works as a .    January 3, 2024:   Patient presents today for follow-up of her chronic rhinosinusitis.  She completed the doxycycline that was prescribed at her last visit.  She also continues to take fluticasone nasal spray, saline irrigations, and azelastine nasal spray.  She has not really had any change in her symptoms.    Review of her CT scan of the sinuses done on December 18, 2023, shows the presence of mucosal edema involving both frontal, both ethmoid, and both maxillary sinuses with obstruction of the ostiomeatal complex.  There is a small amount of mucosal edema within the left sphenoid sinus.  She does appear to have some mucosal edema obstructing the sphenoid sinus ostia bilaterally.  She also has right septal deviation and right inferior turbinate hypertrophy.  ImmunoCAP allergy assay showed her to have a normal IgE level.  She did have elevated titers to ragweed, marsh elder, dust mite, cockroach, and Bermuda grass.  Results were discussed with the patient and her .  A new problem today is that of a several day history of some tender swelling in  her right upper neck.  This is associated with some pain in the area of her right most posterior mandibular molar.  No fever sore throat.  She has not had any treatment.    1/19/24:  Full house Fess with silastic for frontal sinus, septoplasty and inferior turbinate reduction    1/25/24:  Presents for 1st postop.  States she has had congestion and pain from the splints.  Has been using ocean spray occasionally.  She did not  her doxycycline due to the ice she has the pharmacy was closed.  Has been taking pain medicine as needed.  Minimal epistaxis.    2/1/24:  Patient returns for 2nd postop.  She called to come in earlier because she was having significant headaches.  He feels like they are all over her head, particularly behind her eyes.  She occasionally feels dizzy.  She has not been drinking as much water as normally.  She has been using the nasal saline irrigations, but only 1-2 times daily.  She occasionally sees spots with her vision other vision concerns.  No limited neck mobility. Never took doxycycline. Not getting any mucous out from irrigations.     Past Medical History:  Past Medical History:   Diagnosis Date    POTS (postural orthostatic tachycardia syndrome)     No problems for several years     Past Surgical History:   Procedure Laterality Date    adenoids      FUNCTIONAL ENDOSCOPIC SINUS SURGERY (FESS) USING COMPUTER-ASSISTED NAVIGATION Bilateral 1/19/2024    Procedure: FESS, USING COMPUTER-ASSISTED NAVIGATION;  Surgeon: Roberto Alonzo MD;  Location: [facility];  Service: ENT;  Laterality: Bilateral;    SEPTOPLASTY, NOSE, WITH NASAL TURBINATE REDUCTION Bilateral 1/19/2024    Procedure: SEPTOPLASTY, NOSE, WITH NASAL TURBINATE REDUCTION;  Surgeon: Roberto Alonzo MD;  Location: [facility];  Service: ENT;  Laterality: Bilateral;       Social History:  Social History     Socioeconomic History    Marital status:    Tobacco Use    Smoking status: Never    Smokeless tobacco: Never    Substance and Sexual Activity    Alcohol use: Yes     Comment: occ    Drug use: Not Currently    Sexual activity: Yes     Partners: Male   Social History Narrative    ** Merged History Encounter **            Review of Systems:  Unremarkable except as mentioned above.    Current Medications:  Current Outpatient Medications   Medication Sig    aripiprazole (ABILIFY) 10 MG disintegrating tablet Take 10 mg by mouth once.    azelastine (ASTELIN) 137 mcg (0.1 %) nasal spray 1 spray (137 mcg total) by Nasal route 2 (two) times daily.    baclofen (LIORESAL) 10 MG tablet Take 1 tablet (10 mg total) by mouth 3 (three) times daily. for 10 days    cetirizine (ZYRTEC) 10 MG tablet Take 10 mg by mouth.    divalproex (DEPAKOTE) 500 MG TbEC Take 500 mg by mouth 2 (two) times daily.    famotidine (PEPCID) 20 MG tablet Take 20 mg by mouth.    fluticasone propionate (FLONASE) 50 mcg/actuation nasal spray 2 sprays (100 mcg total) by Each Nostril route once daily.    lithium (LITHOTAB) 300 mg tablet Take 300 mg by mouth 2 (two) times daily with meals.    melatonin 3 mg Tab Take 3 mg by mouth once daily.    naproxen (NAPROSYN) 500 MG tablet Take 1 tablet (500 mg total) by mouth 2 (two) times daily with meals. (Patient not taking: Reported on 11/22/2023)    norethindrone-e.estradiol-iron 1 mg-20 mcg(24) /75 mg (4) Chew Take 1 mg by mouth once daily.    oxyCODONE (ROXICODONE) 5 MG immediate release tablet Take 1 tablet (5 mg total) by mouth every 6 (six) hours as needed for Pain.    paroxetine (PAXIL) 10 MG tablet Take 1 tablet by mouth once daily.    quetiapine (SEROQUEL) 100 MG Tab Take 100 mg by mouth every evening.    risperiDONE (RISPERDAL) 1 MG tablet Take 1 mg by mouth 2 (two) times daily.    sodium chloride (SALINE NASAL) 0.65 % nasal spray 1 spray by Nasal route as needed for Congestion.    tiZANidine (ZANAFLEX) 4 MG tablet Take 4 mg by mouth.    topiramate (TOPAMAX) 50 MG tablet Take 50 mg by mouth 2 (two) times daily.     "valACYclovir (VALTREX) 500 MG tablet Take 500 mg by mouth 2 (two) times daily.    venlafaxine (EFFEXOR-XR) 37.5 MG 24 hr capsule Take 37.5 mg by mouth once daily.     No current facility-administered medications for this visit.     Facility-Administered Medications Ordered in Other Visits   Medication    lactated ringers infusion        Allergies:  Review of patient's allergies indicates:   Allergen Reactions    Amoxil [amoxicillin] Hives    Lamotrigine Hives    Penicillins Hives    Sulfa (sulfonamide antibiotics) Hives     bactrum    Amoxicillin Hives    Clindamycin Hives    Hydroxyzine Hallucinations    Ibuprofen Other (See Comments)    Norelgestromin-ethin.estradiol Hives    Penicillin Hives    Pseudoephedrine hcl Hives    Sulfamethoxazole-trimethoprim         Family History:  No family history on file.    Physical Exam:  Vital signs:   Vitals:    02/01/24 1534   BP: 108/67   BP Location: Left arm   Patient Position: Sitting   BP Method: Large (Automatic)   Pulse: 86   Resp: 20   Weight: 97.5 kg (215 lb)   Height: 5' 7" (1.702 m)     General Appearance: well nourished, well-developed, alert, oriented, in mild distress secondary to pain, no dysphonia  Head/Face: Normocephalic, atraumatic. Mild tenderness to palpation over sinuses.   Ears: Hears well at normal conversation volume. Auricle normal  Nose: External nose normal, vestibule tender to touch, septum midline, turbinates well reduced. Significant crusting in bilateral nostrils. In order for better visualization, debridement and endoscopy is indicated (below).   Oral Cavity & Oropharynx: Lips normal. Tongue without masses or lesions. Dentition appropriate for age.  Neck: Soft, non-tender, no palpable lymph nodes.  Neuro: No focal neuro deficits. Alert and awake.   Psychiatric: oriented to time, place and person. Anxious    Procedure: Nasal debridement  Surgeon: Timothy Drummond MD  Anesthesia: 4% lidocaine  Indication: history of endoscopic sinus surgery, " crusting    First, the 30 degree endoscope was used to visualize the right nasal cavity.  There was significant crusting anteriorly which was removed with a combination of forceps and suction.  There was a thick crust adherent to the right inferior turbinate which was unable to be removed.  Once the nasal cavity was better visualized, 4% lidocaine was applied.  The remainder of the debridement was carried forth using suction and forceps, but this was limited due to significant thick dry crusting that was unable to be removed with instrumentation. Due to this, the silastic sheet in her frontal sinus ostium was unable to be visualized.     Next, attention was turned to the left nasal cavity with a 30 degree Schmitz peter endoscope.  There was again significant crusting anteriorly which was removed with a combination of forceps and suctioned.  Again, lidocaine was applied once better visualization was achieved into the middle meatus.  Further debridement was attempted but limited due to thick crusting that was dry and unable to be removed.  Again, there is a thick crusty here in to the left inferior turbinate. Due to all of the above, the silastic sheet in her frontal sinus ostium was unable to be visualized.     Assessment:  Jluia Senior is a 28 y.o. female with chronic pansinusitis, septal deviation, turbinate hypertrophy, and allergic rhinitis s/p FESS, septoplasty, and inferior turbinate reduction on 1/20/24.  She returned today with significant pain, but has not been using her irrigations as frequently as would be desired.  As such, there is significant thick crusting within her nasal cavity which is unable to be comfortably and adequately debrided here in the office.      Plan:  - I discussed the importance of nasal moisturization and irrigations.  I recommended irrigating 4 times daily at the least, more if possible.  I again demonstrated the proper technique for irrigation.  I explained that in office  debridement would be easier and more comfortable after copious irrigation at home.  Patient demonstrated understanding  - Her Mccarty splints were removed at the previous postoperative visit.  However, she still has a silastic sheet stenting open her frontal sinuses bilaterally.   - She will need to return next week for further debridement and silastic removal.     Timothy Drummond MD  [facility] PGY-4  02/01/2024 7:14 PM

## 2024-02-08 ENCOUNTER — OFFICE VISIT (OUTPATIENT)
Dept: OTOLARYNGOLOGY | Facility: CLINIC | Age: 29
End: 2024-02-08
Payer: MEDICAID

## 2024-02-08 VITALS
WEIGHT: 215 LBS | HEIGHT: 67 IN | SYSTOLIC BLOOD PRESSURE: 110 MMHG | HEART RATE: 88 BPM | BODY MASS INDEX: 33.74 KG/M2 | DIASTOLIC BLOOD PRESSURE: 69 MMHG | RESPIRATION RATE: 20 BRPM

## 2024-02-08 DIAGNOSIS — J32.4 CHRONIC PANSINUSITIS: Primary | ICD-10-CM

## 2024-02-08 DIAGNOSIS — J34.89 INTRANASAL SYNECHIAE: ICD-10-CM

## 2024-02-08 DIAGNOSIS — B88.8 INFESTATION BY BED BUG: ICD-10-CM

## 2024-02-08 DIAGNOSIS — Z98.890 HISTORY OF ENDOSCOPIC SINUS SURGERY: ICD-10-CM

## 2024-02-08 PROCEDURE — 31237 NSL/SINS NDSC SURG BX POLYPC: CPT | Mod: PBBFAC | Performed by: STUDENT IN AN ORGANIZED HEALTH CARE EDUCATION/TRAINING PROGRAM

## 2024-02-08 PROCEDURE — 99215 OFFICE O/P EST HI 40 MIN: CPT | Mod: PBBFAC | Performed by: STUDENT IN AN ORGANIZED HEALTH CARE EDUCATION/TRAINING PROGRAM

## 2024-02-08 RX ORDER — SODIUM CHLORIDE 9 MG/ML
INJECTION, SOLUTION INTRAVENOUS CONTINUOUS
Status: CANCELLED | OUTPATIENT
Start: 2024-02-08

## 2024-02-08 NOTE — H&P (VIEW-ONLY)
[facility] Ear, Nose, and Throat Clinic Note  Patient Name: Julia Senior   YOB: 1995     Chief Complaint: follow-up for chronic sinusitis     History of Present Illness:  October 19, 2023:   Julia Senior is a 28 y.o. female with chronic episodic post nasal drip and sore throat for 15 years. She complains of hoarseness and difficulty breathing. She broke her nose at 13 years old without surgical reconstruction, and since then she has sinus congestion every 5 weeks for around 8 days regardless of season. She has been given antibiotics nearly every episode for years but does not find that they help relieve her symptoms or shorten the course. She has tried Flonase without resolution of symptoms. She uses an electric sinus flushing apparatus twice a day during symptomatic episodes. She denies symptoms of GERD - heartburn, reflux, belching. She works as a  and constantly has to take off of work for these issues. She has a past medical history of Pott's syndrome, symptomatic hypoglycemia, eczema, and migraines. She is not currently having sinus infection symptoms.     November 22, 2023:   Patient presents today for follow-up of her chronic sinusitis.  In reviewing the patient's history she would indicated that she is had problems with her nose and sinuses which had developed after a nasal fracture at the age of 13 which had not been repaired.  Since that time she is had problems with perennial nasal congestion, postnasal drainage, associated cough which is occasionally productive, sore throat, and facial pressure.  This tends to be worse in the spring and the fall and is also exacerbated by exposure to cigarette smoke and also with weather changes.  Other symptoms include malaise.  Frequently her nasal drainage we will be discolored.  She has been treated with antibiotics on multiple occasions.  She is usually treated 3-4 times per year and possibly more.  In 1 year previously she had been  treated 9 times with antibiotics.  She is not been treated with antibiotics in the recent past.  She has been treated with various nasal sprays in the past and had not found any long-lasting relief.  She is currently on fluticasone nasal spray which she uses routinely.  She is also on saline irrigations 1-2 times daily.  She has also been treated with azelastine in the past but then had only cause nasal burning.  She does not find much relief with these nasal sprays.  She does have a history of eczema for which she takes cetirizine.  This cetirizine is not helpful with her nasal or sinus symptoms. She does not have problems with paroxysms of sneezing or ocular itching.  Patient does have a history of some food allergies.  Never been on immunotherapy.  Patient did not have her ImmunoCAP allergy assay done as of yet.  Patient is a nonsmoker.  She works as a .    January 3, 2024:   Patient presents today for follow-up of her chronic rhinosinusitis.  She completed the doxycycline that was prescribed at her last visit.  She also continues to take fluticasone nasal spray, saline irrigations, and azelastine nasal spray.  She has not really had any change in her symptoms.    Review of her CT scan of the sinuses done on December 18, 2023, shows the presence of mucosal edema involving both frontal, both ethmoid, and both maxillary sinuses with obstruction of the ostiomeatal complex.  There is a small amount of mucosal edema within the left sphenoid sinus.  She does appear to have some mucosal edema obstructing the sphenoid sinus ostia bilaterally.  She also has right septal deviation and right inferior turbinate hypertrophy.  ImmunoCAP allergy assay showed her to have a normal IgE level.  She did have elevated titers to ragweed, marsh elder, dust mite, cockroach, and Bermuda grass.  Results were discussed with the patient and her .  A new problem today is that of a several day history of some tender swelling in  "her right upper neck.  This is associated with some pain in the area of her right most posterior mandibular molar.  No fever sore throat.  She has not had any treatment.    1/19/24:  Full house Fess with silastic for frontal sinus, septoplasty and inferior turbinate reduction    1/25/24:  Presents for 1st postop.  States she has had congestion and pain from the splints.  Has been using ocean spray occasionally.  She did not  her doxycycline due to the ice she has the pharmacy was closed.  Has been taking pain medicine as needed.  Minimal epistaxis.    2/1/24:  Patient returns for 2nd postop.  She called to come in earlier because she was having significant headaches.  He feels like they are all over her head, particularly behind her eyes.  She occasionally feels dizzy.  She has not been drinking as much water as normally.  She has been using the nasal saline irrigations, but only 1-2 times daily.  She occasionally sees spots with her vision other vision concerns.  No limited neck mobility. Never took doxycycline. Not getting any mucous out from irrigations.     2/8/24:  Patient returns for her 3rd postop appointment.  Her headaches are about the same, but she feels like he can breathe much better through her nose and she has been doing the irrigations.  She has been getting some crusting out.  She also does note that she has been fighting a bedbug infestation for the last several months, but is not sure if her skin itching is related to this or to her eczema.    Physical Exam:  Vital signs:   Vitals:    02/08/24 1333   BP: 110/69   BP Location: Left arm   Patient Position: Sitting   BP Method: Large (Automatic)   Pulse: 88   Resp: 20   Weight: 97.5 kg (215 lb)   Height: 5' 7" (1.702 m)     General Appearance: well nourished, well-developed, alert, oriented, in mild distress secondary to pain, no dysphonia  Head/Face: Normocephalic, atraumatic. Mild tenderness to palpation over sinuses.   Ears: Hears well at " normal conversation volume. Auricle normal  Nose: External nose normal, vestibule tender to touch, septum midline, turbinates well reduced. Significant crusting in bilateral nostrils. In order for better visualization, debridement and endoscopy is indicated (below).   Oral Cavity & Oropharynx: Lips normal. Tongue without masses or lesions. Dentition appropriate for age.  Neck: Soft, non-tender, no palpable lymph nodes.  Neuro: No focal neuro deficits. Alert and awake.   Psychiatric: oriented to time, place and person. Anxious    Procedure: Nasal debridement  Surgeon: Timothy Drummond MD  Anesthesia: 4% lidocaine  Indication: history of endoscopic sinus surgery, crusting    First, 4% lidocaine was applied to both nasal cavities. The 0 degree endoscope was used to visualize the right nasal cavity.  There was significant crusting anteriorly which was removed with a combination of forceps and suction.  There was a thick crust in her middle meatus which was removed.  Once the nasal cavity was better visualized, a 30 degree scope was used to evaluate the remainder of the sinonasal cavity.   The remainder of the debridement was carried forth using suction and forceps, but this was limited due to some synechiae of the middle turbinate (partially lysed).     Next, attention was turned to the left nasal cavity with a 0 degree Schmitz peter endoscope.  There was again significant crusting anteriorly which was removed with a combination of forceps and suctioned.  Further debridement was attempted but limited due to large synechiae which has resulted in scarring of the middle turbinate to the lateral wall. This was partially lysed but the middle meatus was unable to be completely visualized due to patient discomfort.      The silastic frontal sinus stents were not visualized in either cavity.     Assessment:  Julia Senior is a 28 y.o. female with chronic pansinusitis, septal deviation, turbinate hypertrophy, and allergic rhinitis  s/p FESS, septoplasty, and inferior turbinate reduction on 1/20/24.  She is healing well, but does have significant crusting and synechiae which makes a complete debridement quite difficult here in the office.  Debridement today was aborted secondary to limited visualization and patient discomfort.  She likely still has bilateral silastic frontal sinus stents in place although these were unable to be visualized today.       Plan:  - I discussed options for treatment and occluding continued debridements and irrigations in the office versus going to the operating room to accomplish the same goal.  I explained that in the operating room.  Debridement would be more comfortable, we would be able to lyse her adhesions, and we can remove her silastic stents from her frontal sinus.  She is in agreement with this plan.  We will plan for Monday, February 19.   - She does note today that she is fighting a bed bug infestation and had some bed bugs on her person today. We have taken some specimens of these to bring to lab to identify. We will discuss with the hospital/surgery scheduler to determine what other precautions may be needed for this prior to surgery.   - Continue aggressive and frequent nasal saline irrigations  - RTC 1 week after surgery    Timothy Drummond MD  [facility] PGY-4  02/08/2024 7:14 PM

## 2024-02-08 NOTE — LETTER
February 8, 2024        Brigida Hawkins MD  [address]  [facility] [address]             [facility] En[facility] [address]  [address] [facility] [address]  Phone: [phone]   Patient: Julia Senior   MR Number: [id number]   YOB: 1995   Date of Visit: 2/8/2024       Dear Dr. Hawkins:    Thank you for referring Julia Senior to me for evaluation. Below are the relevant portions of my assessment and plan of care.    Assessment:  Julia Senior is a 28 y.o. female with chronic pansinusitis, septal deviation, turbinate hypertrophy, and allergic rhinitis s/p FESS, septoplasty, and inferior turbinate reduction on 1/20/24.  She is healing well, but does have significant crusting and synechiae which makes a complete debridement quite difficult here in the office.  Debridement today was aborted secondary to limited visualization and patient discomfort.  She likely still has bilateral silastic frontal sinus stents in place although these were unable to be visualized today.         Plan:  - I discussed options for treatment and occluding continued debridements and irrigations in the office versus going to the operating room to accomplish the same goal.  I explained that in the operating room.  Debridement would be more comfortable, we would be able to lyse her adhesions, and we can remove her silastic stents from her frontal sinus.  She is in agreement with this plan.  We will plan for Monday, February 19.   - She does note today that she is fighting a bed bug infestation and had some bed bugs on her person today. We have taken some specimens of these to bring to lab to identify. We will discuss with the hospital/surgery scheduler to determine what other precautions may be needed for this prior to surgery.   - Continue aggressive and frequent nasal saline irrigations  - RTC 1 week after surgery       If you have questions, please do not hesitate to call me. I look forward to  following Julia along with you.    Sincerely,      Timothy Drummond MD  [facility]    CC  No Recipients

## 2024-02-08 NOTE — PROGRESS NOTES
[facility] N[facility], [facility] Throat Clinic Note  Patient Name: Julia Senior   YOB: 1995     Chief Complaint: follow-up for chronic sinusitis     History of Present Illness:  October 19, 2023:   Julia Senior is a 28 y.o. female with chronic episodic post nasal drip and sore throat for 15 years. She complains of hoarseness and difficulty breathing. She broke her nose at 13 years old without surgical reconstruction, and since then she has sinus congestion every 5 weeks for around 8 days regardless of season. She has been given antibiotics nearly every episode for years but does not find that they help relieve her symptoms or shorten the course. She has tried Flonase without resolution of symptoms. She uses an electric sinus flushing apparatus twice a day during symptomatic episodes. She denies symptoms of GERD - heartburn, reflux, belching. She works as a  and constantly has to take off of work for these issues. She has a past medical history of Pott's syndrome, symptomatic hypoglycemia, eczema, and migraines. She is not currently having sinus infection symptoms.     November 22, 2023:   Patient presents today for follow-up of her chronic sinusitis.  In reviewing the patient's history she would indicated that she is had problems with her nose and sinuses which had developed after a nasal fracture at the age of 13 which had not been repaired.  Since that time she is had problems with perennial nasal congestion, postnasal drainage, associated cough which is occasionally productive, sore throat, and facial pressure.  This tends to be worse in the spring and the fall and is also exacerbated by exposure to cigarette smoke and also with weather changes.  Other symptoms include malaise.  Frequently her nasal drainage we will be discolored.  She has been treated with antibiotics on multiple occasions.  She is usually treated 3-4 times per year and possibly more.  In 1 year previously she had been  treated 9 times with antibiotics.  She is not been treated with antibiotics in the recent past.  She has been treated with various nasal sprays in the past and had not found any long-lasting relief.  She is currently on fluticasone nasal spray which she uses routinely.  She is also on saline irrigations 1-2 times daily.  She has also been treated with azelastine in the past but then had only cause nasal burning.  She does not find much relief with these nasal sprays.  She does have a history of eczema for which she takes cetirizine.  This cetirizine is not helpful with her nasal or sinus symptoms. She does not have problems with paroxysms of sneezing or ocular itching.  Patient does have a history of some food allergies.  Never been on immunotherapy.  Patient did not have her ImmunoCAP allergy assay done as of yet.  Patient is a nonsmoker.  She works as a .    January 3, 2024:   Patient presents today for follow-up of her chronic rhinosinusitis.  She completed the doxycycline that was prescribed at her last visit.  She also continues to take fluticasone nasal spray, saline irrigations, and azelastine nasal spray.  She has not really had any change in her symptoms.    Review of her CT scan of the sinuses done on December 18, 2023, shows the presence of mucosal edema involving both frontal, both ethmoid, and both maxillary sinuses with obstruction of the ostiomeatal complex.  There is a small amount of mucosal edema within the left sphenoid sinus.  She does appear to have some mucosal edema obstructing the sphenoid sinus ostia bilaterally.  She also has right septal deviation and right inferior turbinate hypertrophy.  ImmunoCAP allergy assay showed her to have a normal IgE level.  She did have elevated titers to ragweed, marsh elder, dust mite, cockroach, and Bermuda grass.  Results were discussed with the patient and her .  A new problem today is that of a several day history of some tender swelling in  "her right upper neck.  This is associated with some pain in the area of her right most posterior mandibular molar.  No fever sore throat.  She has not had any treatment.    1/19/24:  Full house Fess with silastic for frontal sinus, septoplasty and inferior turbinate reduction    1/25/24:  Presents for 1st postop.  States she has had congestion and pain from the splints.  Has been using ocean spray occasionally.  She did not  her doxycycline due to the ice she has the pharmacy was closed.  Has been taking pain medicine as needed.  Minimal epistaxis.    2/1/24:  Patient returns for 2nd postop.  She called to come in earlier because she was having significant headaches.  He feels like they are all over her head, particularly behind her eyes.  She occasionally feels dizzy.  She has not been drinking as much water as normally.  She has been using the nasal saline irrigations, but only 1-2 times daily.  She occasionally sees spots with her vision other vision concerns.  No limited neck mobility. Never took doxycycline. Not getting any mucous out from irrigations.     2/8/24:  Patient returns for her 3rd postop appointment.  Her headaches are about the same, but she feels like he can breathe much better through her nose and she has been doing the irrigations.  She has been getting some crusting out.  She also does note that she has been fighting a bedbug infestation for the last several months, but is not sure if her skin itching is related to this or to her eczema.    Physical Exam:  Vital signs:   Vitals:    02/08/24 1333   BP: 110/69   BP Location: Left arm   Patient Position: Sitting   BP Method: Large (Automatic)   Pulse: 88   Resp: 20   Weight: 97.5 kg (215 lb)   Height: 5' 7" (1.702 m)     General Appearance: well nourished, well-developed, alert, oriented, in mild distress secondary to pain, no dysphonia  Head/Face: Normocephalic, atraumatic. Mild tenderness to palpation over sinuses.   Ears: Hears well at " normal conversation volume. Auricle normal  Nose: External nose normal, vestibule tender to touch, septum midline, turbinates well reduced. Significant crusting in bilateral nostrils. In order for better visualization, debridement and endoscopy is indicated (below).   Oral Cavity & Oropharynx: Lips normal. Tongue without masses or lesions. Dentition appropriate for age.  Neck: Soft, non-tender, no palpable lymph nodes.  Neuro: No focal neuro deficits. Alert and awake.   Psychiatric: oriented to time, place and person. Anxious    Procedure: Nasal debridement  Surgeon: Timothy Drummond MD  Anesthesia: 4% lidocaine  Indication: history of endoscopic sinus surgery, crusting    First, 4% lidocaine was applied to both nasal cavities. The 0 degree endoscope was used to visualize the right nasal cavity.  There was significant crusting anteriorly which was removed with a combination of forceps and suction.  There was a thick crust in her middle meatus which was removed.  Once the nasal cavity was better visualized, a 30 degree scope was used to evaluate the remainder of the sinonasal cavity.   The remainder of the debridement was carried forth using suction and forceps, but this was limited due to some synechiae of the middle turbinate (partially lysed).     Next, attention was turned to the left nasal cavity with a 0 degree Schmitz peter endoscope.  There was again significant crusting anteriorly which was removed with a combination of forceps and suctioned.  Further debridement was attempted but limited due to large synechiae which has resulted in scarring of the middle turbinate to the lateral wall. This was partially lysed but the middle meatus was unable to be completely visualized due to patient discomfort.      The silastic frontal sinus stents were not visualized in either cavity.     Assessment:  Julia Senior is a 28 y.o. female with chronic pansinusitis, septal deviation, turbinate hypertrophy, and allergic rhinitis  s/p FESS, septoplasty, and inferior turbinate reduction on 1/20/24.  She is healing well, but does have significant crusting and synechiae which makes a complete debridement quite difficult here in the office.  Debridement today was aborted secondary to limited visualization and patient discomfort.  She likely still has bilateral silastic frontal sinus stents in place although these were unable to be visualized today.       Plan:  - I discussed options for treatment and occluding continued debridements and irrigations in the office versus going to the operating room to accomplish the same goal.  I explained that in the operating room.  Debridement would be more comfortable, we would be able to lyse her adhesions, and we can remove her silastic stents from her frontal sinus.  She is in agreement with this plan.  We will plan for Monday, February 19.   - She does note today that she is fighting a bed bug infestation and had some bed bugs on her person today. We have taken some specimens of these to bring to lab to identify. We will discuss with the hospital/surgery scheduler to determine what other precautions may be needed for this prior to surgery.   - Continue aggressive and frequent nasal saline irrigations  - RTC 1 week after surgery    Timothy Drummond MD  [facility] Otolaryngology PGY-4  02/08/2024 7:14 PM

## 2024-02-09 ENCOUNTER — ANESTHESIA EVENT (OUTPATIENT)
Dept: SURGERY | Facility: HOSPITAL | Age: 29
End: 2024-02-09
Payer: MEDICAID

## 2024-02-09 NOTE — ANESTHESIA PREPROCEDURE EVALUATION
02/09/2024  Julia Senior is a 28 y.o. female presenting for DEBRIDEMENT-SINUS (Bilateral: Face)  with a history of         Vitals:    03/04/24 0802 03/04/24 0812   BP:  110/73   Pulse:  73   Temp:  36.5 °C (97.7 °F)   TempSrc:  Oral   SpO2:  97%   Weight: 94.7 kg (208 lb 12.8 oz)        -nasal septal deviation/nasal turbinate hypertrophy/FESS 1/19/24  -POTS (asymptomatic x years)  -Depression (on Lithium)  -GERD  -BMI 33    BETA-BLOCKER: None    New Orders for Anesthesia: UPT NEG DOS ; , CMP  Note, bed bug issue x several months at home 2/8/24 per ENT note    Patient Active Problem List   Diagnosis    Rectal bleed    POTS (postural orthostatic tachycardia syndrome)    Nasal septal deviation    Nasal turbinate hypertrophy    Chronic pansinusitis    History of endoscopic sinus surgery       Past Surgical History:   Procedure Laterality Date    adenoids      FUNCTIONAL ENDOSCOPIC SINUS SURGERY (FESS) USING COMPUTER-ASSISTED NAVIGATION Bilateral 1/19/2024    Procedure: FESS, USING COMPUTER-ASSISTED NAVIGATION;  Surgeon: Roberto Alonzo MD;  Location: [facility];  Service: ENT;  Laterality: Bilateral;    SEPTOPLASTY, NOSE, WITH NASAL TURBINATE REDUCTION Bilateral 1/19/2024    Procedure: SEPTOPLASTY, NOSE, WITH NASAL TURBINATE REDUCTION;  Surgeon: Roberto Alonzo MD;  Location: [facility];  Service: ENT;  Laterality: Bilateral;       Lab Results   Component Value Date    WBC 11.30 01/19/2024    HGB 12.2 01/19/2024    HCT 36.4 (L) 01/19/2024     01/19/2024       CMP  Sodium Level   Date Value Ref Range Status   10/30/2022 141 136 - 145 mmol/L Final     Potassium Level   Date Value Ref Range Status   10/30/2022 3.6 3.5 - 5.1 mmol/L Final     Carbon Dioxide   Date Value Ref Range Status   10/30/2022 23 22 - 29 mmol/L Final     Blood Urea Nitrogen   Date Value Ref Range Status   10/30/2022 13.5  7.0 - 18.7 mg/dL Final     Creatinine   Date Value Ref Range Status   10/30/2022 0.76 0.55 - 1.02 mg/dL Final     Calcium Level Total   Date Value Ref Range Status   10/30/2022 8.9 8.4 - 10.2 mg/dL Final     Albumin Level   Date Value Ref Range Status   10/30/2022 3.6 3.5 - 5.0 gm/dL Final     Bilirubin Total   Date Value Ref Range Status   10/30/2022 0.2 <=1.5 mg/dL Final     Alkaline Phosphatase   Date Value Ref Range Status   10/30/2022 86 40 - 150 unit/L Final     Aspartate Aminotransferase   Date Value Ref Range Status   10/30/2022 12 5 - 34 unit/L Final     Alanine Aminotransferase   Date Value Ref Range Status   10/30/2022 11 0 - 55 unit/L Final     eGFR   Date Value Ref Range Status   10/30/2022 >60 mls/min/1.73/m2 Final       Lab Results   Component Value Date    INR 1.0 10/26/2021     EKG 1/19/2024:      Echo 10/27/21:        Past anesthesia records (1/19/2024):         Pre-op Assessment    I have reviewed the Patient Summary Reports.     I have reviewed the Nursing Notes. I have reviewed the NPO Status.   I have reviewed the Medications.     Review of Systems  Anesthesia Hx:  No problems with previous Anesthesia   History of prior surgery of interest to airway management or planning:          Denies Family Hx of Anesthesia complications.    Denies Personal Hx of Anesthesia complications.                    Hematology/Oncology:  Hematology Normal   Oncology Normal                                   EENT/Dental:  EENT/Dental Normal           Cardiovascular:  Cardiovascular Normal                                            Pulmonary:  Pulmonary Normal                       Renal/:  Renal/ Normal                 Hepatic/GI:  Hepatic/GI Normal                 Musculoskeletal:  Musculoskeletal Normal                Neurological:  Neurology Normal                                      Endocrine:  Endocrine Normal            Dermatological:  Skin Normal    Psych:  Psychiatric Normal                  Physical  Exam  General: Well nourished, Cooperative, Alert and Oriented    Airway:  Mallampati: I / I  Mouth Opening: Normal  TM Distance: Normal  Tongue: Normal  Neck ROM: Normal ROM    Dental:  Intact      Anesthesia Plan  Type of Anesthesia, risks & benefits discussed:    Anesthesia Type: Gen ETT  Intra-op Monitoring Plan: Standard ASA Monitors  Post Op Pain Control Plan: multimodal analgesia and IV/PO Opioids PRN  Induction:  IV  Airway Plan: Direct  Informed Consent: Informed consent signed with the Patient and all parties understand the risks and agree with anesthesia plan.  All questions answered. Patient consented to blood products? No  ASA Score: 3  Day of Surgery Review of History & Physical: H&P Update referred to the surgeon/provider.    Ready For Surgery From Anesthesia Perspective.     .

## 2024-02-12 NOTE — OR NURSING
Upon calling patient for pre-op, she informed me she is requesting to reschedule her surgery due to her  currently being offshore for work.   She wishes to reschedule surgery to 3/4/24.   I informed Dawn - OR  and spoke to Carina in the ENT Clinic to inform the surgeons.  She verbalized understanding.

## 2024-02-15 NOTE — PROGRESS NOTES
I have reviewed and agree with the resident's findings, including all diagnostic interpretations and plans as written.     Roberto Alonzo M.D.

## 2024-02-19 ENCOUNTER — ANESTHESIA (OUTPATIENT)
Dept: SURGERY | Facility: HOSPITAL | Age: 29
End: 2024-02-19
Payer: MEDICAID

## 2024-03-04 ENCOUNTER — HOSPITAL ENCOUNTER (OUTPATIENT)
Facility: HOSPITAL | Age: 29
Discharge: HOME | End: 2024-03-04
Attending: OTOLARYNGOLOGY | Admitting: OTOLARYNGOLOGY
Payer: MEDICAID

## 2024-03-04 DIAGNOSIS — J32.4 CHRONIC PANSINUSITIS: ICD-10-CM

## 2024-03-04 DIAGNOSIS — Z98.890 HISTORY OF ENDOSCOPIC SINUS SURGERY: ICD-10-CM

## 2024-03-04 DIAGNOSIS — J34.89 INTRANASAL SYNECHIAE: ICD-10-CM

## 2024-03-04 DIAGNOSIS — Z01.818 PREOP EXAMINATION: Primary | ICD-10-CM

## 2024-03-04 LAB
ALBUMIN SERPL-MCNC: 3.5 G/DL (ref 3.5–5)
ALBUMIN/GLOB SERPL: 0.9 RATIO (ref 1.1–2)
ALP SERPL-CCNC: 94 UNIT/L (ref 40–150)
ALT SERPL-CCNC: 9 UNIT/L (ref 0–55)
AST SERPL-CCNC: 10 UNIT/L (ref 5–34)
B-HCG UR QL: NEGATIVE
BILIRUB SERPL-MCNC: 0.3 MG/DL
BUN SERPL-MCNC: 8.1 MG/DL (ref 7–18.7)
CALCIUM SERPL-MCNC: 9.2 MG/DL (ref 8.4–10.2)
CHLORIDE SERPL-SCNC: 113 MMOL/L (ref 98–107)
CO2 SERPL-SCNC: 19 MMOL/L (ref 22–29)
CREAT SERPL-MCNC: 0.8 MG/DL (ref 0.55–1.02)
CTP QC/QA: YES
GFR SERPLBLD CREATININE-BSD FMLA CKD-EPI: >60 MLS/MIN/1.73/M2
GLOBULIN SER-MCNC: 3.8 GM/DL (ref 2.4–3.5)
GLUCOSE SERPL-MCNC: 85 MG/DL (ref 74–100)
POTASSIUM SERPL-SCNC: 4.1 MMOL/L (ref 3.5–5.1)
PROT SERPL-MCNC: 7.3 GM/DL (ref 6.4–8.3)
SODIUM SERPL-SCNC: 139 MMOL/L (ref 136–145)

## 2024-03-04 PROCEDURE — 80053 COMPREHEN METABOLIC PANEL: CPT | Performed by: NURSE PRACTITIONER

## 2024-03-04 PROCEDURE — 25000003 PHARM REV CODE 250: Performed by: NURSE ANESTHETIST, CERTIFIED REGISTERED

## 2024-03-04 PROCEDURE — 63600175 PHARM REV CODE 636 W HCPCS: Performed by: ANESTHESIOLOGY

## 2024-03-04 PROCEDURE — 63600175 PHARM REV CODE 636 W HCPCS: Performed by: NURSE ANESTHETIST, CERTIFIED REGISTERED

## 2024-03-04 PROCEDURE — 71000015 HC POSTOP RECOV 1ST HR: Performed by: OTOLARYNGOLOGY

## 2024-03-04 PROCEDURE — D9220A PRA ANESTHESIA: Mod: ANES,,, | Performed by: SPECIALIST

## 2024-03-04 PROCEDURE — 63600175 PHARM REV CODE 636 W HCPCS: Performed by: SPECIALIST

## 2024-03-04 PROCEDURE — 25000003 PHARM REV CODE 250: Performed by: OTOLARYNGOLOGY

## 2024-03-04 PROCEDURE — 71000033 HC RECOVERY, INTIAL HOUR: Performed by: OTOLARYNGOLOGY

## 2024-03-04 PROCEDURE — 36000709 HC OR TIME LEV III EA ADD 15 MIN: Performed by: OTOLARYNGOLOGY

## 2024-03-04 PROCEDURE — 37000009 HC ANESTHESIA EA ADD 15 MINS: Performed by: OTOLARYNGOLOGY

## 2024-03-04 PROCEDURE — D9220A PRA ANESTHESIA: Mod: CRNA,,, | Performed by: NURSE ANESTHETIST, CERTIFIED REGISTERED

## 2024-03-04 PROCEDURE — 36000708 HC OR TIME LEV III 1ST 15 MIN: Performed by: OTOLARYNGOLOGY

## 2024-03-04 PROCEDURE — 81025 URINE PREGNANCY TEST: CPT | Performed by: NURSE PRACTITIONER

## 2024-03-04 PROCEDURE — 71000016 HC POSTOP RECOV ADDL HR: Performed by: OTOLARYNGOLOGY

## 2024-03-04 PROCEDURE — 37000008 HC ANESTHESIA 1ST 15 MINUTES: Performed by: OTOLARYNGOLOGY

## 2024-03-04 PROCEDURE — 27201423 OPTIME MED/SURG SUP & DEVICES STERILE SUPPLY: Performed by: OTOLARYNGOLOGY

## 2024-03-04 RX ORDER — HYDROMORPHONE HYDROCHLORIDE 1 MG/ML
0.5 INJECTION, SOLUTION INTRAMUSCULAR; INTRAVENOUS; SUBCUTANEOUS EVERY 5 MIN PRN
Status: ACTIVE | OUTPATIENT
Start: 2024-03-04

## 2024-03-04 RX ORDER — SODIUM CHLORIDE 9 MG/ML
INJECTION, SOLUTION INTRAVENOUS CONTINUOUS
Status: DISCONTINUED | OUTPATIENT
Start: 2024-03-04 | End: 2024-03-04 | Stop reason: HOSPADM

## 2024-03-04 RX ORDER — ROCURONIUM BROMIDE 10 MG/ML
INJECTION, SOLUTION INTRAVENOUS
Status: DISCONTINUED | OUTPATIENT
Start: 2024-03-04 | End: 2024-03-04

## 2024-03-04 RX ORDER — PREDNISONE 20 MG/1
TABLET ORAL
Qty: 12 TABLET | Refills: 0 | Status: SHIPPED | OUTPATIENT
Start: 2024-03-04 | End: 2024-03-11

## 2024-03-04 RX ORDER — DIPHENHYDRAMINE HYDROCHLORIDE 50 MG/ML
25 INJECTION INTRAMUSCULAR; INTRAVENOUS ONCE AS NEEDED
Status: ACTIVE | OUTPATIENT
Start: 2024-03-04 | End: 2035-08-01

## 2024-03-04 RX ORDER — OXYCODONE AND ACETAMINOPHEN 5; 325 MG/1; MG/1
2 TABLET ORAL ONCE
Status: ACTIVE | OUTPATIENT
Start: 2024-03-04

## 2024-03-04 RX ORDER — KETOROLAC TROMETHAMINE 30 MG/ML
INJECTION, SOLUTION INTRAMUSCULAR; INTRAVENOUS
Status: DISCONTINUED | OUTPATIENT
Start: 2024-03-04 | End: 2024-03-04

## 2024-03-04 RX ORDER — PROCHLORPERAZINE EDISYLATE 5 MG/ML
5 INJECTION INTRAMUSCULAR; INTRAVENOUS ONCE AS NEEDED
Status: ACTIVE | OUTPATIENT
Start: 2024-03-04 | End: 2035-08-01

## 2024-03-04 RX ORDER — MIDAZOLAM HYDROCHLORIDE 1 MG/ML
2 INJECTION INTRAMUSCULAR; INTRAVENOUS ONCE
Status: COMPLETED | OUTPATIENT
Start: 2024-03-04 | End: 2024-03-04

## 2024-03-04 RX ORDER — ONDANSETRON HYDROCHLORIDE 2 MG/ML
4 INJECTION, SOLUTION INTRAVENOUS ONCE
Status: ACTIVE | OUTPATIENT
Start: 2024-03-04

## 2024-03-04 RX ORDER — MEPERIDINE HYDROCHLORIDE 25 MG/ML
12.5 INJECTION INTRAMUSCULAR; INTRAVENOUS; SUBCUTANEOUS ONCE
Status: ACTIVE | OUTPATIENT
Start: 2024-03-04 | End: 2024-03-05

## 2024-03-04 RX ORDER — PROPOFOL 10 MG/ML
VIAL (ML) INTRAVENOUS
Status: DISCONTINUED | OUTPATIENT
Start: 2024-03-04 | End: 2024-03-04

## 2024-03-04 RX ORDER — DEXAMETHASONE SODIUM PHOSPHATE 4 MG/ML
INJECTION, SOLUTION INTRA-ARTICULAR; INTRALESIONAL; INTRAMUSCULAR; INTRAVENOUS; SOFT TISSUE
Status: DISCONTINUED | OUTPATIENT
Start: 2024-03-04 | End: 2024-03-04

## 2024-03-04 RX ORDER — HYDROMORPHONE HYDROCHLORIDE 1 MG/ML
0.2 INJECTION, SOLUTION INTRAMUSCULAR; INTRAVENOUS; SUBCUTANEOUS EVERY 5 MIN PRN
Status: ACTIVE | OUTPATIENT
Start: 2024-03-04

## 2024-03-04 RX ORDER — DEXMEDETOMIDINE HYDROCHLORIDE 100 UG/ML
INJECTION, SOLUTION INTRAVENOUS
Status: DISCONTINUED | OUTPATIENT
Start: 2024-03-04 | End: 2024-03-04

## 2024-03-04 RX ORDER — TALC
3 POWDER (GRAM) TOPICAL NIGHTLY
Refills: 0
Start: 2024-03-04

## 2024-03-04 RX ORDER — OXYMETAZOLINE HCL 0.05 %
SPRAY, NON-AEROSOL (ML) NASAL
Status: DISCONTINUED | OUTPATIENT
Start: 2024-03-04 | End: 2024-03-04 | Stop reason: HOSPADM

## 2024-03-04 RX ORDER — SODIUM CHLORIDE, SODIUM LACTATE, POTASSIUM CHLORIDE, CALCIUM CHLORIDE 600; 310; 30; 20 MG/100ML; MG/100ML; MG/100ML; MG/100ML
INJECTION, SOLUTION INTRAVENOUS CONTINUOUS
Status: ACTIVE | OUTPATIENT
Start: 2024-03-04

## 2024-03-04 RX ORDER — FENTANYL CITRATE 50 UG/ML
INJECTION, SOLUTION INTRAMUSCULAR; INTRAVENOUS
Status: DISCONTINUED | OUTPATIENT
Start: 2024-03-04 | End: 2024-03-04

## 2024-03-04 RX ORDER — LIDOCAINE HYDROCHLORIDE 20 MG/ML
INJECTION INTRAVENOUS
Status: DISCONTINUED | OUTPATIENT
Start: 2024-03-04 | End: 2024-03-04

## 2024-03-04 RX ORDER — IPRATROPIUM BROMIDE AND ALBUTEROL SULFATE 2.5; .5 MG/3ML; MG/3ML
3 SOLUTION RESPIRATORY (INHALATION) ONCE AS NEEDED
Status: ACTIVE | OUTPATIENT
Start: 2024-03-04 | End: 2035-08-01

## 2024-03-04 RX ORDER — ONDANSETRON HYDROCHLORIDE 2 MG/ML
INJECTION, SOLUTION INTRAVENOUS
Status: DISCONTINUED | OUTPATIENT
Start: 2024-03-04 | End: 2024-03-04

## 2024-03-04 RX ADMIN — ONDANSETRON 4 MG: 2 INJECTION INTRAMUSCULAR; INTRAVENOUS at 10:03

## 2024-03-04 RX ADMIN — LIDOCAINE HYDROCHLORIDE 50 MG: 20 INJECTION INTRAVENOUS at 10:03

## 2024-03-04 RX ADMIN — FENTANYL CITRATE 100 MCG: 50 INJECTION INTRAMUSCULAR; INTRAVENOUS at 10:03

## 2024-03-04 RX ADMIN — PROPOFOL 150 MG: 10 INJECTION, EMULSION INTRAVENOUS at 10:03

## 2024-03-04 RX ADMIN — SODIUM CHLORIDE, POTASSIUM CHLORIDE, SODIUM LACTATE AND CALCIUM CHLORIDE: 600; 310; 30; 20 INJECTION, SOLUTION INTRAVENOUS at 10:03

## 2024-03-04 RX ADMIN — DEXAMETHASONE SODIUM PHOSPHATE 8 MG: 4 INJECTION, SOLUTION INTRA-ARTICULAR; INTRALESIONAL; INTRAMUSCULAR; INTRAVENOUS; SOFT TISSUE at 10:03

## 2024-03-04 RX ADMIN — DEXMEDETOMIDINE 20 MCG: 200 INJECTION, SOLUTION INTRAVENOUS at 11:03

## 2024-03-04 RX ADMIN — MIDAZOLAM HYDROCHLORIDE 2 MG: 1 INJECTION, SOLUTION INTRAMUSCULAR; INTRAVENOUS at 10:03

## 2024-03-04 RX ADMIN — KETOROLAC TROMETHAMINE 30 MG: 30 INJECTION, SOLUTION INTRAMUSCULAR at 10:03

## 2024-03-04 RX ADMIN — SUGAMMADEX 200 MG: 100 INJECTION, SOLUTION INTRAVENOUS at 11:03

## 2024-03-04 RX ADMIN — ROCURONIUM BROMIDE 40 MG: 10 INJECTION INTRAVENOUS at 10:03

## 2024-03-04 NOTE — BRIEF OP NOTE
[facility] - Periop Services  Brief Operative Note    Surgery Date: 3/4/2024     Surgeon(s) and Role:     * Roberto Alonzo MD - Primary    Assisting Surgeon: Timothy Drummond MD    Pre-op Diagnosis:   History of endoscopic sinus surgery  Chronic rhinosinusitis with nasal polyposis  Intranasal synechiae  Nasal obstruction    Post-op Diagnosis:  Post-Op Diagnosis Codes:     * Intranasal synechiae [J34.89]     * History of endoscopic sinus surgery [Z98.890]     * Chronic pansinusitis [J32.4]    Procedure(s) (LRB):  DEBRIDEMENT-SINUS (Bilateral)    Anesthesia: General    Operative Findings: Silastic stents in frontal sinuses bilaterally. Frontal sinus outflow tracts patent bilaterally with some post obstructive mucus in left frontal sinus. Crusting in inferior meatus bilaterally. Polypoid changes to bilateral posterior ethmoid cells and sphenoid sinuses. Finger cots places in bilateral middle meatus.     Estimated Blood Loss: 10cc         Specimens:   Specimen (24h ago, onward)      None              Discharge Note    OUTCOME: Patient tolerated treatment/procedure well without complication and is now ready for discharge.    DISPOSITION: Home or Self Care    FINAL DIAGNOSIS:  Chronic rhinosinusitis with nasal polyposis    FOLLOWUP: In clinic    DISCHARGE INSTRUCTIONS:    Discharge Procedure Orders   Diet Adult Regular     Leave dressing on - Keep it clean, dry, and intact until clinic visit   Order Comments: Leave nasal packing in place     Other Wound Care Instructions   Order Comments: Continue nasal saline irrigations three times daily     Timothy Drummond MD  [employer] Otolaryngology PGY-4  03/04/2024 11:47 AM

## 2024-03-04 NOTE — OP NOTE
[facility] OTOLARYNGOLOGY OPERATIVE REPORT    Patient Name: Julia Senior  Date of Admission: 3/4/2024  YOB: 1995  Date of procedure: 03/04/2024    Attending Surgeon: Roberto Alonzo MD    Resident Surgeon(s):   Timothy Drummond MD, HO-IV    Pre-operative diagnosis:  1. Chronic rhinosinusitis with nasal polyposis  2. Nasal obstruction  3. History of endoscopic sinus surgery  4. History of septoplasty  5. Intranasal synechiae  6. Allergic rhinitis      Post-operative diagnosis: Same    Procedure:  1. Nasal endoscopy with sinonasal debridement, bilateral  2. Lysis of intranasal synechiae, bilateral  3. Removal of frontal sinus silastic stents bilaterally    Anesthesia: General endotracheal anesthesia    Blood Loss: 10cc    Implants: Finger cot x 2 placed in bilateral middle meatus    Drains: None    Specimens: None    Complications: None    Findings:   There was extensive crusting in the inferior meatus bilaterally, right worse than left.  Bilaterally, the middle turbinate had scarred over to the lateral wall of the nasal cavity resulting in near total obstruction of the middle meatus.    Silastic stents in frontal sinuses bilaterally. Frontal sinus outflow tracts patent bilaterally with some post obstructive mucus in left frontal sinus.    Polypoid changes to mucosa of bilateral posterior ethmoid cells and sphenoid sinuses. However, they were widely patent. The maxillary sinuses appeared healthy and widely patent bilaterally.   The medial aspect of the middle turbinate and the opposing surface of the nasal septum were scored in order to allow for more permanent medialization of the middle turbinate. This was performed bilaterally. Finger cots placed bilaterally in each middle meatus.      Indication:  Julia Senior is a 28 y.o. female with a history of chronic rhinosinusitis with nasal polyposis. She underwent endoscopic sinus surgery, septoplasty, and inferior turbinate reduction on January 19, 2024. When  she subsequently returned to our office, she had significant nasal crusting and evidence of intranasal synechiae. Debridement was partially completed but unable to be tolerated in the office, and her silastic stents were unable to be identified at this time. As a result, the joint decision was made to proceed with debridement and removal of stents in the operative setting. Informed consent was obtained from patient. All risks, benefits, and alternatives were reviewed, and all questions were answered.     Procedure in detail:    After appropriate witnessed informed consent was obtained, the patient was taken down to the operating room and laid in the supine position. General endotracheal anesthesia then commenced without complications.    The bed was then rotated 90 degrees and the patient was appropriately positioned for surgery. The nasal cavity was prepared with oxymetazoline soaked cottonoid pledgets. The patient was then prepped and draped out in the usual fashion. Before proceeding any further, a full timeout was performed identifying the correct patient and the operative site.     Next, the 0-degree endoscope was used to visualize the right nasal cavity. Thick crusting was debrided from the inferior meatus using suction and forceps. The middle turbinate had scarred to the lateral wall of the nasal cavity. This synechiae was sharply divided with a Lynn elevator, and forceps were used to remove excess scar tissue. Mucus was suctioned from the middle meatus, maxillary sinus, and sphenoethmoid air cells with changes to the mucosa as above. The silastic stent was noted to be in good position in the frontal sinus; this was removed using angled Blakesly forceps. Patency of the frontal sinus ostium was confirmed. There was no mucus in the frontal sinus.    Next, the 0-degree endoscope was used to visualize the left nasal cavity. Thick crusting was debrided from the inferior meatus using suction and forceps. The  middle turbinate had scarred to the lateral wall of the nasal cavity. This synechiae was sharply divided with a Stanley elevator, and forceps were used to remove excess scar tissue. Mucus was suctioned from the middle meatus, maxillary sinus, and sphenoethmoid air cells with changes to the mucosa as above. The silastic stent was noted to be in good position in the frontal sinus; this was removed using angled Blakesly forceps. Patency of the frontal sinus ostium was confirmed. There was some mucus in the frontal sinus.    Having confirmed that the sinonasal cavities were completely debrided, attention was turned towards lateralization of the middle turbinate. First, on the left, a skyler elevator was used to score the mucosa on the lateral aspect of the middle turbinate as well as on the mucosa of the opposing nasal septum. Next, a finger cot (fashioned by placing a Merocel pack inside the finger of a glove, and secured with a Nylon suture) was placed into the middle meatus and underneath the axilla to stent open the middle meatus and allow for the middle turbinate to heal to the lateral wall.  Next, on the right, a skyler elevator was used to score the mucosa on the lateral aspect of the middle turbinate as well as on the mucosa of the opposing nasal septum. Next, a finger cot was placed into the middle meatus and underneath the axilla to stent open the middle meatus and allow for the middle turbinate to heal to the lateral wall.    Next, the nasal cavities were irrigated and hemostasis was achieved. The patient was suctioned with an orogastric tube. The patient was then turned back to the anesthesia team in stable condition. All counts were correct at the conclusion of the operation. She tolerated the procedure very well and without complication.     Dr. Alonzo was present for every step of this surgical procedure.    Timothy Drummond MD  [facility] Otolaryngology, HO-IV  3/4/2024 12:39 PM

## 2024-03-04 NOTE — ANESTHESIA PROCEDURE NOTES
Intubation    Date/Time: 3/4/2024 10:34 AM    Performed by: Carmita Urrutia CRNA  Authorized by: Carmita Urrutia CRNA    Intubation:     Induction:  Intravenous    Intubated:  Postinduction    Mask Ventilation:  Easy with oral airway    Attempts:  1    Attempted By:  CRNA    Method of Intubation:  Direct    Blade:  Zapata 2    Laryngeal View Grade: Grade I - full view of cords      Difficult Airway Encountered?: No      Complications:  None    Airway Device:  Oral endotracheal tube    Airway Device Size:  7.0    Style/Cuff Inflation:  Cuffed (inflated to minimal occlusive pressure)    Inflation Amount (mL):  5    Tube secured:  21    Secured at:  The lips    Placement Verified By:  Capnometry    Complicating Factors:  None    Findings Post-Intubation:  BS equal bilateral and atraumatic/condition of teeth unchanged  Notes:      Cuff pressure 28 cm H2O

## 2024-03-04 NOTE — INTERVAL H&P NOTE
The patient has been examined and the H&P has been reviewed:    I concur with the findings and changes have been noted since the H&P was written: patient has been through extensive decontamination procedures for surgery today.      Surgery risks, benefits and alternative options discussed and understood by patient/family.          There are no hospital problems to display for this patient.

## 2024-03-04 NOTE — TRANSFER OF CARE
Anesthesia Transfer of Care Note    Patient: Julia Senior    Procedure(s) Performed: Procedure(s) (LRB):  DEBRIDEMENT-SINUS (Bilateral)    Patient location: PACU    Anesthesia Type: general    Transport from OR: Transported from OR on room air with adequate spontaneous ventilation    Post pain: adequate analgesia    Post assessment: no apparent anesthetic complications    Post vital signs: stable    Level of consciousness: sedated    Nausea/Vomiting: no nausea/vomiting    Complications: none    Transfer of care protocol was followed    Pt recovered in OR due to bed bugs. Report to PACU ALL Power in OR.

## 2024-03-04 NOTE — DISCHARGE INSTRUCTIONS
"Discharge Instructions - Endoscopic Sinus DEbridement    Please Read and Follow These Instructions for the Best Outcomes Following Your Surgery!      WHAT TO EXPECT FOLLOWING SURGERY:    Bleeding - A drip from the nose and into the back of the throat is normal for the first two days following surgery. For flowing blood (like a faucet), contact the ENT Doctor on call as noted below.    Nasal congestion, fullness, facial pain, headache, and disrupted sleep are normal for the first week and are an expected part of the healing process. This will improve after the first debridement is performed in clinic about one week following surgery.     NASAL IRRIGATIONS:    Good post-operative irrigation on your part is essential to a successful outcome!    Instructions:  Use the Irrigation bottle/method mixed with a sterile saline solution at least three times per day.  Use distilled, bottled or boiled water tap to make up the saline solution according to the instructions.  Because you have "finger cot" packing in place, use these irrigations lightly.     ACTIVITY:      Minimize your activities with only light activity for the first day following surgery.   Listen to your body!  If you feel tired over the first few days, you should rest.  No nose blowing, stooping, straining or heavy lifting > 2 bags of groceries.   Sneeze with your mouth open.   Sleeping with your head elevated will reduce bleeding from the nose.  If you have been prescribed a CPAP machine, do not use until your doctor says it is safe; sleep in a recliner chair with your head elevated in the meantime.    MEDICATIONS:      Resume your home medications   Do not take any aspirin containing products or blood thinners (Goody's, BC Powder or Cornelius aspirin) until told to by your surgeon  Do not take any intranasal medicines until told to by your surgeon (gentle sinus irrigations are ok)  Pain:    Over the counter Tylenol (Acetaminophen) as per the instructions on the " bottle  NEVER DRIVE A VEHICLE OR OPERATE HEAVY MACHINERY WHEN TAKING PRESCRIPTION PAIN MEDICINE!    DRIVING:    Do not drive within 24 hours of receiving anesthesia    Do not drive while taking prescription pain medication    DIET:    Resume a healthy, balanced, normal diet as you feel up to it.    FOLLOW-UP:      You will have important follow-up appointments starting about one week following surgery    You should take a prescription pain pill (with food) prior to the first follow up visit in preparation for a clinic debridement.  If you do not have an appointment, please call the numbers below to schedule a visit.    APPOINTMENTS:    [facility]: [phone]      Call our office or go to the Emergency Room immediately if you have any of the following:  Any vision problems/changes  Fever over 101.4 degrees  Neck stiffness  Heavy or prolonged bright red bleeding from the nose    Regular hours:  [phone] to speak with a nurse.  After hours:  [phone] and ask for the Ear, Nose and Throat (ENT) doctor on call.

## 2024-03-04 NOTE — ANESTHESIA POSTPROCEDURE EVALUATION
Anesthesia Post Evaluation    Patient: Julia Senior    Procedure(s) Performed: Procedure(s) (LRB):  DEBRIDEMENT-SINUS (Bilateral)    Final Anesthesia Type: general      Patient location during evaluation: PACU  Patient participation: Yes- Able to Participate  Level of consciousness: awake and responds to stimulation  Post-procedure vital signs: reviewed and stable  Pain management: adequate  Airway patency: patent    PONV status at discharge: No PONV  Anesthetic complications: no      Cardiovascular status: blood pressure returned to baseline  Respiratory status: unassisted  Hydration status: euvolemic  Follow-up not needed.              Vitals Value Taken Time   /80 03/04/24 1317   Temp 36.5 °C (97.7 °F) 03/04/24 1235   Pulse 57 03/04/24 1325   Resp 18 03/04/24 1235   SpO2 93 % 03/04/24 1325   Vitals shown include unvalidated device data.      Event Time   Out of Recovery 12:27:00         Pain/Grisel Score: Grisel Score: 10 (3/4/2024 12:35 PM)  Modified Grisel Score: 20 (3/4/2024  1:35 PM)

## 2024-03-05 VITALS
OXYGEN SATURATION: 100 % | HEART RATE: 61 BPM | RESPIRATION RATE: 18 BRPM | BODY MASS INDEX: 32.7 KG/M2 | TEMPERATURE: 98 F | DIASTOLIC BLOOD PRESSURE: 80 MMHG | WEIGHT: 208.81 LBS | SYSTOLIC BLOOD PRESSURE: 120 MMHG

## 2024-03-06 PROBLEM — J34.89 INTRANASAL SYNECHIAE: Status: ACTIVE | Noted: 2024-03-06

## 2024-03-12 ENCOUNTER — OFFICE VISIT (OUTPATIENT)
Dept: OTOLARYNGOLOGY | Facility: CLINIC | Age: 29
End: 2024-03-12
Payer: MEDICAID

## 2024-03-12 VITALS
BODY MASS INDEX: 32.65 KG/M2 | RESPIRATION RATE: 22 BRPM | DIASTOLIC BLOOD PRESSURE: 86 MMHG | HEART RATE: 86 BPM | HEIGHT: 67 IN | WEIGHT: 208 LBS | OXYGEN SATURATION: 99 % | SYSTOLIC BLOOD PRESSURE: 112 MMHG

## 2024-03-12 DIAGNOSIS — J32.4 CHRONIC PANSINUSITIS: ICD-10-CM

## 2024-03-12 DIAGNOSIS — B88.8 INFESTATION BY BED BUG: ICD-10-CM

## 2024-03-12 DIAGNOSIS — R09.82 ALLERGIC RHINITIS WITH POSTNASAL DRIP: ICD-10-CM

## 2024-03-12 DIAGNOSIS — Z98.890 HISTORY OF ENDOSCOPIC SINUS SURGERY: ICD-10-CM

## 2024-03-12 DIAGNOSIS — J34.89 INTRANASAL SYNECHIAE: Primary | ICD-10-CM

## 2024-03-12 DIAGNOSIS — J30.9 ALLERGIC RHINITIS WITH POSTNASAL DRIP: ICD-10-CM

## 2024-03-12 PROCEDURE — 99214 OFFICE O/P EST MOD 30 MIN: CPT | Mod: PBBFAC | Performed by: STUDENT IN AN ORGANIZED HEALTH CARE EDUCATION/TRAINING PROGRAM

## 2024-03-24 NOTE — PROGRESS NOTES
[facility] Note  Patient Name: Julia Senior   YOB: 1995     Chief Complaint: follow-up for chronic sinusitis     History of Present Illness:  October 19, 2023:   Julia Senior is a 28 y.o. female with chronic episodic post nasal drip and sore throat for 15 years. She complains of hoarseness and difficulty breathing. She broke her nose at 13 years old without surgical reconstruction, and since then she has sinus congestion every 5 weeks for around 8 days regardless of season. She has been given antibiotics nearly every episode for years but does not find that they help relieve her symptoms or shorten the course. She has tried Flonase without resolution of symptoms. She uses an electric sinus flushing apparatus twice a day during symptomatic episodes. She denies symptoms of GERD - heartburn, reflux, belching. She works as a  and constantly has to take off of work for these issues. She has a past medical history of Pott's syndrome, symptomatic hypoglycemia, eczema, and migraines. She is not currently having sinus infection symptoms.     November 22, 2023:   Patient presents today for follow-up of her chronic sinusitis.  In reviewing the patient's history she would indicated that she is had problems with her nose and sinuses which had developed after a nasal fracture at the age of 13 which had not been repaired.  Since that time she is had problems with perennial nasal congestion, postnasal drainage, associated cough which is occasionally productive, sore throat, and facial pressure.  This tends to be worse in the spring and the fall and is also exacerbated by exposure to cigarette smoke and also with weather changes.  Other symptoms include malaise.  Frequently her nasal drainage we will be discolored.  She has been treated with antibiotics on multiple occasions.  She is usually treated 3-4 times per year and possibly more.  In 1 year previously she had been  treated 9 times with antibiotics.  She is not been treated with antibiotics in the recent past.  She has been treated with various nasal sprays in the past and had not found any long-lasting relief.  She is currently on fluticasone nasal spray which she uses routinely.  She is also on saline irrigations 1-2 times daily.  She has also been treated with azelastine in the past but then had only cause nasal burning.  She does not find much relief with these nasal sprays.  She does have a history of eczema for which she takes cetirizine.  This cetirizine is not helpful with her nasal or sinus symptoms. She does not have problems with paroxysms of sneezing or ocular itching.  Patient does have a history of some food allergies.  Never been on immunotherapy.  Patient did not have her ImmunoCAP allergy assay done as of yet.  Patient is a nonsmoker.  She works as a .    January 3, 2024:   Patient presents today for follow-up of her chronic rhinosinusitis.  She completed the doxycycline that was prescribed at her last visit.  She also continues to take fluticasone nasal spray, saline irrigations, and azelastine nasal spray.  She has not really had any change in her symptoms.    Review of her CT scan of the sinuses done on December 18, 2023, shows the presence of mucosal edema involving both frontal, both ethmoid, and both maxillary sinuses with obstruction of the ostiomeatal complex.  There is a small amount of mucosal edema within the left sphenoid sinus.  She does appear to have some mucosal edema obstructing the sphenoid sinus ostia bilaterally.  She also has right septal deviation and right inferior turbinate hypertrophy.  ImmunoCAP allergy assay showed her to have a normal IgE level.  She did have elevated titers to ragweed, marsh elder, dust mite, cockroach, and Bermuda grass.  Results were discussed with the patient and her .  A new problem today is that of a several day history of some tender swelling in  her right upper neck.  This is associated with some pain in the area of her right most posterior mandibular molar.  No fever sore throat.  She has not had any treatment.    1/19/24:  Full house Fess with silastic for frontal sinus, septoplasty and inferior turbinate reduction    1/25/24:  Presents for 1st postop.  States she has had congestion and pain from the splints.  Has been using ocean spray occasionally.  She did not  her doxycycline due to the ice she has the pharmacy was closed.  Has been taking pain medicine as needed.  Minimal epistaxis.    2/1/24:  Patient returns for 2nd postop.  She called to come in earlier because she was having significant headaches.  He feels like they are all over her head, particularly behind her eyes.  She occasionally feels dizzy.  She has not been drinking as much water as normally.  She has been using the nasal saline irrigations, but only 1-2 times daily.  She occasionally sees spots with her vision other vision concerns.  No limited neck mobility. Never took doxycycline. Not getting any mucous out from irrigations.     2/8/24:  Patient returns for her 3rd postop appointment.  Her headaches are about the same, but she feels like he can breathe much better through her nose and she has been doing the irrigations.  She has been getting some crusting out.  She also does note that she has been fighting a bedbug infestation for the last several months, but is not sure if her skin itching is related to this or to her eczema.    3/12/2024: Here today for first postop after she was taken to the OR for formal debridement after she could not tolerate the in-clinic procedure. She does report she can breathe a whole lot better. She has been doing her rinses twice a day. She is still very hesitant to let anyone scope her today..      Physical Exam:  Vital signs:   Vitals:    03/12/24 1338   BP: 112/86   BP Location: Left arm   Patient Position: Sitting   BP Method: Large  "(Automatic)   Pulse: 86   Resp: (!) 22   SpO2: 99%   Weight: 94.3 kg (208 lb)   Height: 5' 7" (1.702 m)     General Appearance: well nourished, well-developed, alert, oriented, in mild distress secondary to pain, no dysphonia  Head/Face: Normocephalic, atraumatic. Mild tenderness to palpation over sinuses.   Ears: Hears well at normal conversation volume. Auricle normal  Nose: External nose normal, vestibule tender to touch, septum midline, turbinates well reduced. Anterior rhinoscopy reveals mild amount of irritation and crusting at caudal septum but no large obstructive crusts or synechiae  Oral Cavity & Oropharynx: Lips normal. Tongue without masses or lesions. Dentition appropriate for age.  Neck: Soft, non-tender, no palpable lymph nodes.  Neuro: No focal neuro deficits. Alert and awake.   Psychiatric: oriented to time, place and person. Anxious          Assessment:  Julia Senior is a 28 y.o. female with chronic pansinusitis, septal deviation, turbinate hypertrophy, and allergic rhinitis s/p FESS, septoplasty, and inferior turbinate reduction on 1/20/24 now s/p OR formal debridement on 3/4.     Today patient is still anxious and hesitant to allow scoping/debridement. Since all appeared well on anterior rhinoscopy without patient symptoms of obstruction or mucus, explained to patient could forego today's endoscopy and debridement but next time would be mandatory. Also if that were the case, asked her to increase to three rinses a day and add small amount baby shampoo to rinses.       Plan:  - Continue aggressive and frequent nasal saline irrigations now to TID and with baby shampoo  - start using flonase   - RTC 2-3weeks for debridement     Renee Colunga MD  [facility]  [facility]    "

## 2024-07-01 ENCOUNTER — HOSPITAL ENCOUNTER (EMERGENCY)
Facility: HOSPITAL | Age: 29
Discharge: HOME | End: 2024-07-01
Attending: EMERGENCY MEDICINE
Payer: MEDICAID

## 2024-07-01 VITALS
TEMPERATURE: 98 F | OXYGEN SATURATION: 99 % | WEIGHT: 200 LBS | BODY MASS INDEX: 31.39 KG/M2 | RESPIRATION RATE: 18 BRPM | HEART RATE: 73 BPM | SYSTOLIC BLOOD PRESSURE: 109 MMHG | HEIGHT: 67 IN | DIASTOLIC BLOOD PRESSURE: 74 MMHG

## 2024-07-01 DIAGNOSIS — M62.830 SPASM OF LEFT TRAPEZIUS MUSCLE: Primary | ICD-10-CM

## 2024-07-01 LAB
B-HCG UR QL: NEGATIVE
CTP QC/QA: YES

## 2024-07-01 PROCEDURE — 99283 EMERGENCY DEPT VISIT LOW MDM: CPT | Mod: 25

## 2024-07-01 PROCEDURE — 81025 URINE PREGNANCY TEST: CPT | Performed by: PHYSICIAN ASSISTANT

## 2024-07-01 RX ORDER — METHOCARBAMOL 500 MG/1
1000 TABLET, FILM COATED ORAL 3 TIMES DAILY PRN
Qty: 30 TABLET | Refills: 0 | Status: SHIPPED | OUTPATIENT
Start: 2024-07-01 | End: 2024-07-06

## 2024-07-02 NOTE — ED PROVIDER NOTES
Encounter Date: 7/1/2024       History     Chief Complaint   Patient presents with    Shoulder Pain     Pt presents with L shoulder x1 day. Pt reports taking tylenol 3 before coming to Ed.      Patient with pain located on her left trap. She stated she has a history of muscle spasms. She used to take tizanidine however her doctor stopped prescribing this for her and she doesn't know why. She stated her  gave her some medicine that is making her feel funny. She doesn't know what it is but handed over an empty bottle of tylenol #3.    The history is provided by the patient. No  was used.     Review of patient's allergies indicates:   Allergen Reactions    Amoxil [amoxicillin] Hives    Lamotrigine Hives    Penicillins Hives    Sulfa (sulfonamide antibiotics) Hives     bactrum    Amoxicillin Hives    Clindamycin Hives    Hydroxyzine Hallucinations    Ibuprofen Other (See Comments)    Norelgestromin-ethin.estradiol Hives    Penicillin Hives    Pseudoephedrine hcl Hives    Sulfamethoxazole-trimethoprim      Past Medical History:   Diagnosis Date    POTS (postural orthostatic tachycardia syndrome)     No problems for several years     Past Surgical History:   Procedure Laterality Date    adenoids      DEBRIDEMENT-SINUS Bilateral 3/4/2024    Procedure: DEBRIDEMENT-SINUS;  Surgeon: Roberto Alonzo MD;  Location: [facility];  Service: ENT;  Laterality: Bilateral;  Need FESS set but do not need navigation. Irrigation.    FUNCTIONAL ENDOSCOPIC SINUS SURGERY (FESS) USING COMPUTER-ASSISTED NAVIGATION Bilateral 1/19/2024    Procedure: FESS, USING COMPUTER-ASSISTED NAVIGATION;  Surgeon: Roberto Alonzo MD;  Location: [facility] OR;  Service: ENT;  Laterality: Bilateral;    NASAL ENDOSCOPY Bilateral 3/4/2024    Procedure: ENDOSCOPY, NOSE;  Surgeon: Roberto Alonzo MD;  Location: [facility] OR;  Service: ENT;  Laterality: Bilateral;    SEPTOPLASTY, NOSE, WITH NASAL TURBINATE REDUCTION Bilateral 1/19/2024     Procedure: SEPTOPLASTY, NOSE, WITH NASAL TURBINATE REDUCTION;  Surgeon: Roberto Alonzo MD;  Location: [facility];  Service: ENT;  Laterality: Bilateral;     No family history on file.  Social History     Tobacco Use    Smoking status: Never    Smokeless tobacco: Never   Substance Use Topics    Alcohol use: Yes     Comment: occ    Drug use: Not Currently     Review of Systems   Constitutional:  Negative for fever.   HENT:  Negative for sore throat.    Respiratory:  Negative for shortness of breath.    Cardiovascular:  Negative for chest pain.   Gastrointestinal:  Negative for nausea.   Genitourinary:  Negative for dysuria.   Musculoskeletal:  Positive for neck pain. Negative for back pain.   Skin:  Negative for rash.   Neurological:  Negative for weakness.   Hematological:  Does not bruise/bleed easily.       Physical Exam     Initial Vitals [07/01/24 2026]   BP Pulse Resp Temp SpO2   109/74 73 18 97.6 °F (36.4 °C) 99 %      MAP       --         Physical Exam    Nursing note and vitals reviewed.  Constitutional: She appears well-developed and well-nourished.   HENT:   Head: Normocephalic and atraumatic.   Eyes: Conjunctivae and EOM are normal. Pupils are equal, round, and reactive to light.   Neck: Neck supple.   Spasm in C5-6 distribution on left     Cardiovascular:  Normal rate and regular rhythm.           Pulmonary/Chest: Breath sounds normal. No respiratory distress.   Abdominal: Abdomen is soft. Bowel sounds are normal. She exhibits no distension. There is no abdominal tenderness.   Musculoskeletal:         General: No edema.      Cervical back: Neck supple. Muscular tenderness present. Decreased range of motion.     Neurological: She is alert and oriented to person, place, and time. She has normal strength.   Skin: Skin is warm and dry.   Psychiatric: Thought content normal.         ED Course   Procedures  Labs Reviewed   POCT URINE PREGNANCY          Imaging Results              X-Ray Cervical Spine 2 or 3  Views (Preliminary result)  Result time 07/01/24 22:05:36      Wet Read by Moyn Quiroz PA (07/01/24 22:05:36, [facility], Emergency Medicine)    No acute abnormality                                      Medications - No data to display  Medical Decision Making  Patient presents presents with left upper back pain        Amount and/or Complexity of Data Reviewed  Labs: ordered. Decision-making details documented in ED Course.  Radiology: ordered and independent interpretation performed.    Risk  Prescription drug management.  Risk Details: Given strict ED return precautions. I have spoken with the patient and/or caregivers. I have explained the patient's condition, diagnoses and treatment plan based on the information available to me at this time. I have answered the patient's and/or caregiver's questions and addressed any concerns. The patient and/or caregivers have as good an understanding of the patient's diagnosis, condition and treatment plan as can be expected at this point. The vital signs have been stable. The patient's condition is stable and appropriate for discharge from the emergency department.      The patient will pursue further outpatient evaluation with the primary care physician or other designated or consulting physician as outlined in the discharge instructions. The patient and/or caregivers are agreeable to this plan of care and follow-up instructions have been explained in detail. The patient and/or caregivers have received these instructions in written format and have expressed an understanding of the discharge instructions. The patient and/or caregivers are aware that any significant change in condition or worsening of symptoms should prompt an immediate return to this or the closest emergency department or a call to 911.      Additional MDM:   Differential Diagnosis:   cervical fracture, spinal stenosis, epidural abscess, spine osteomyelitis, MS, cauda  cooper, among others              ED Course as of 07/01/24 2211 Mon Jul 01, 2024 2102 Transition of care to SELVIN Castillo [LS]   2205 hCG Qualitative, Urine: Negative [SA]   2206 Assumed care of patient at 21:00 [SA]      ED Course User Index  [LS] Luana Sánchez, FNP  [SA] Mony Quiroz PA                           Clinical Impression:  Final diagnoses:  [M62.830] Spasm of left trapezius muscle (Primary)          ED Disposition Condition    Discharge Stable          ED Prescriptions       Medication Sig Dispense Start Date End Date Auth. Provider    methocarbamoL (ROBAXIN) 500 MG Tab Take 2 tablets (1,000 mg total) by mouth 3 (three) times daily as needed (muscle spasms). 30 tablet 7/1/2024 7/6/2024 Mony Quiroz PA          Follow-up Information       Follow up With Specialties Details Why Contact Info    [facility] Emergency Medicine  If symptoms worsen return to ED immediately [address] [facility] [address] [phone]    Brigida Hawkins MD Family Medicine In 1 day  [address] [facility] [id number] [phone]      Primary Care Provider  Go in 1 day               Mony Quiroz PA  07/01/24 2211

## 2024-08-27 ENCOUNTER — HOSPITAL ENCOUNTER (EMERGENCY)
Facility: HOSPITAL | Age: 29
Discharge: HOME | End: 2024-08-27
Attending: EMERGENCY MEDICINE
Payer: MEDICAID

## 2024-08-27 VITALS
TEMPERATURE: 99 F | HEIGHT: 67 IN | HEART RATE: 81 BPM | SYSTOLIC BLOOD PRESSURE: 110 MMHG | DIASTOLIC BLOOD PRESSURE: 88 MMHG | OXYGEN SATURATION: 96 % | RESPIRATION RATE: 16 BRPM | BODY MASS INDEX: 31.39 KG/M2 | WEIGHT: 200 LBS

## 2024-08-27 DIAGNOSIS — U07.1 COVID: Primary | ICD-10-CM

## 2024-08-27 LAB
ALBUMIN SERPL-MCNC: 3.7 G/DL (ref 3.5–5)
ALBUMIN/GLOB SERPL: 1.2 RATIO (ref 1.1–2)
ALP SERPL-CCNC: 92 UNIT/L (ref 40–150)
ALT SERPL-CCNC: 11 UNIT/L (ref 0–55)
ANION GAP SERPL CALC-SCNC: 8 MEQ/L
APAP SERPL-MCNC: 12 UG/ML (ref 10–30)
AST SERPL-CCNC: 9 UNIT/L (ref 5–34)
BASOPHILS # BLD AUTO: 0.03 X10(3)/MCL
BASOPHILS NFR BLD AUTO: 0.2 %
BILIRUB SERPL-MCNC: 0.4 MG/DL
BUN SERPL-MCNC: 8.3 MG/DL (ref 7–18.7)
CALCIUM SERPL-MCNC: 9.1 MG/DL (ref 8.4–10.2)
CHLORIDE SERPL-SCNC: 113 MMOL/L (ref 98–107)
CO2 SERPL-SCNC: 18 MMOL/L (ref 22–29)
CREAT SERPL-MCNC: 0.93 MG/DL (ref 0.55–1.02)
CREAT/UREA NIT SERPL: 9
EOSINOPHIL # BLD AUTO: 0.16 X10(3)/MCL (ref 0–0.9)
EOSINOPHIL NFR BLD AUTO: 1 %
ERYTHROCYTE [DISTWIDTH] IN BLOOD BY AUTOMATED COUNT: 13.2 % (ref 11.5–17)
FLUAV AG UPPER RESP QL IA.RAPID: NOT DETECTED
FLUBV AG UPPER RESP QL IA.RAPID: NOT DETECTED
GFR SERPLBLD CREATININE-BSD FMLA CKD-EPI: >60 ML/MIN/1.73/M2
GLOBULIN SER-MCNC: 3 GM/DL (ref 2.4–3.5)
GLUCOSE SERPL-MCNC: 115 MG/DL (ref 74–100)
HCT VFR BLD AUTO: 35.9 % (ref 37–47)
HGB BLD-MCNC: 11.7 G/DL (ref 12–16)
IMM GRANULOCYTES # BLD AUTO: 0.06 X10(3)/MCL (ref 0–0.04)
IMM GRANULOCYTES NFR BLD AUTO: 0.4 %
LYMPHOCYTES # BLD AUTO: 1.56 X10(3)/MCL (ref 0.6–4.6)
LYMPHOCYTES NFR BLD AUTO: 10 %
MCH RBC QN AUTO: 26.5 PG (ref 27–31)
MCHC RBC AUTO-ENTMCNC: 32.6 G/DL (ref 33–36)
MCV RBC AUTO: 81.4 FL (ref 80–94)
MONOCYTES # BLD AUTO: 0.76 X10(3)/MCL (ref 0.1–1.3)
MONOCYTES NFR BLD AUTO: 4.9 %
NEUTROPHILS # BLD AUTO: 12.97 X10(3)/MCL (ref 2.1–9.2)
NEUTROPHILS NFR BLD AUTO: 83.5 %
NRBC BLD AUTO-RTO: 0 %
PLATELET # BLD AUTO: 270 X10(3)/MCL (ref 130–400)
PMV BLD AUTO: 9.1 FL (ref 7.4–10.4)
POTASSIUM SERPL-SCNC: 3.8 MMOL/L (ref 3.5–5.1)
PROT SERPL-MCNC: 6.7 GM/DL (ref 6.4–8.3)
RBC # BLD AUTO: 4.41 X10(6)/MCL (ref 4.2–5.4)
SARS-COV-2 RNA RESP QL NAA+PROBE: DETECTED
SODIUM SERPL-SCNC: 139 MMOL/L (ref 136–145)
WBC # BLD AUTO: 15.54 X10(3)/MCL (ref 4.5–11.5)

## 2024-08-27 PROCEDURE — 96361 HYDRATE IV INFUSION ADD-ON: CPT

## 2024-08-27 PROCEDURE — 0240U COVID/FLU A&B PCR: CPT | Performed by: EMERGENCY MEDICINE

## 2024-08-27 PROCEDURE — 96375 TX/PRO/DX INJ NEW DRUG ADDON: CPT

## 2024-08-27 PROCEDURE — 96374 THER/PROPH/DIAG INJ IV PUSH: CPT

## 2024-08-27 PROCEDURE — 80053 COMPREHEN METABOLIC PANEL: CPT | Performed by: EMERGENCY MEDICINE

## 2024-08-27 PROCEDURE — 63600175 PHARM REV CODE 636 W HCPCS: Performed by: EMERGENCY MEDICINE

## 2024-08-27 PROCEDURE — 85025 COMPLETE CBC W/AUTO DIFF WBC: CPT | Performed by: EMERGENCY MEDICINE

## 2024-08-27 PROCEDURE — 99284 EMERGENCY DEPT VISIT MOD MDM: CPT | Mod: 25

## 2024-08-27 PROCEDURE — 80143 DRUG ASSAY ACETAMINOPHEN: CPT | Performed by: EMERGENCY MEDICINE

## 2024-08-27 RX ORDER — ONDANSETRON 4 MG/1
4 TABLET, FILM COATED ORAL EVERY 6 HOURS
Qty: 12 TABLET | Refills: 0 | Status: SHIPPED | OUTPATIENT
Start: 2024-08-27

## 2024-08-27 RX ORDER — KETOROLAC TROMETHAMINE 30 MG/ML
30 INJECTION, SOLUTION INTRAMUSCULAR; INTRAVENOUS
Status: COMPLETED | OUTPATIENT
Start: 2024-08-27 | End: 2024-08-27

## 2024-08-27 RX ORDER — ONDANSETRON HYDROCHLORIDE 2 MG/ML
4 INJECTION, SOLUTION INTRAVENOUS
Status: COMPLETED | OUTPATIENT
Start: 2024-08-27 | End: 2024-08-27

## 2024-08-27 RX ADMIN — SODIUM CHLORIDE, POTASSIUM CHLORIDE, SODIUM LACTATE AND CALCIUM CHLORIDE 1000 ML: 600; 310; 30; 20 INJECTION, SOLUTION INTRAVENOUS at 07:08

## 2024-08-27 RX ADMIN — KETOROLAC TROMETHAMINE 30 MG: 30 INJECTION, SOLUTION INTRAMUSCULAR at 07:08

## 2024-08-27 RX ADMIN — ONDANSETRON 4 MG: 2 INJECTION INTRAMUSCULAR; INTRAVENOUS at 07:08

## 2024-08-27 NOTE — ED PROVIDER NOTES
"Encounter Date: 2024       History     Chief Complaint   Patient presents with    Cough     Pt tested positive for covid last night with a home test, fever today of 103. Pt took six 500mg of tylenol, didn't realize dose was so high, thought it was "250mg per pill". Pt has non productive cough.      Patient presents with cough congestion positive COVID test last night but she states it was an  test.  Patient took 6 Tylenol at 330/345 a.m. not knowing that they were 500 mg thought they were 325 patient states she normally takes 1.2 g at a time.  Only when she gets migraines.  Patient with cough congestion nausea body aches.  Fever and chills recent hysterectomy approximately 5 weeks ago no abdominal pain no vaginal bleeding or discharge no dysuria    The history is provided by the patient.     Review of patient's allergies indicates:   Allergen Reactions    Amoxil [amoxicillin] Hives    Lamotrigine Hives    Penicillins Hives    Sulfa (sulfonamide antibiotics) Hives     bactrum    Amoxicillin Hives    Clindamycin Hives    Hydroxyzine Hallucinations    Ibuprofen Other (See Comments)    Norelgestromin-ethin.estradiol Hives    Penicillin Hives    Pseudoephedrine hcl Hives    Sulfamethoxazole-trimethoprim      Past Medical History:   Diagnosis Date    POTS (postural orthostatic tachycardia syndrome)     No problems for several years     Past Surgical History:   Procedure Laterality Date    adenoids      DEBRIDEMENT-SINUS Bilateral 3/4/2024    Procedure: DEBRIDEMENT-SINUS;  Surgeon: Roberto Alonzo MD;  Location: [facility];  Service: ENT;  Laterality: Bilateral;  Need FESS set but do not need navigation. Irrigation.    FUNCTIONAL ENDOSCOPIC SINUS SURGERY (FESS) USING COMPUTER-ASSISTED NAVIGATION Bilateral 2024    Procedure: FESS, USING COMPUTER-ASSISTED NAVIGATION;  Surgeon: Roberto Alonzo MD;  Location: [facility];  Service: ENT;  Laterality: Bilateral;    NASAL ENDOSCOPY Bilateral 3/4/2024    Procedure: " ENDOSCOPY, NOSE;  Surgeon: Roberto Alonzo MD;  Location: [facility] [place] [facility];  Service: ENT;  Laterality: Bilateral;    SEPTOPLASTY, NOSE, WITH NASAL TURBINATE REDUCTION Bilateral 1/19/2024    Procedure: SEPTOPLASTY, NOSE, WITH NASAL TURBINATE REDUCTION;  Surgeon: Roberto Alonzo MD;  Location: [facility] [place] [facility];  Service: ENT;  Laterality: Bilateral;     No family history on file.  Social History     Tobacco Use    Smoking status: Never    Smokeless tobacco: Never   Substance Use Topics    Alcohol use: Yes     Comment: occ    Drug use: Not Currently     Review of Systems   Constitutional:  Positive for chills, fatigue and fever.   HENT:  Negative for sore throat.    Respiratory:  Positive for cough and shortness of breath.    Cardiovascular:  Negative for chest pain.   Gastrointestinal:  Negative for nausea.   Genitourinary:  Negative for dysuria.   Musculoskeletal:  Negative for back pain.   Skin:  Negative for rash.   Neurological:  Negative for weakness.   Hematological:  Does not bruise/bleed easily.       Physical Exam     Initial Vitals [08/27/24 0713]   BP Pulse Resp Temp SpO2   (!) 112/50 106 18 99.8 °F (37.7 °C) 96 %      MAP       --         Physical Exam    Nursing note and vitals reviewed.  Constitutional: She appears well-developed and well-nourished.   Pleasant appears mildly uncomfortable   HENT:   Head: Normocephalic and atraumatic.   Mouth/Throat: Oropharynx is clear and moist.   Eyes: Conjunctivae are normal. Pupils are equal, round, and reactive to light.   Neck: Neck supple.   Normal range of motion.  Cardiovascular:  Normal rate, regular rhythm and normal heart sounds.           Pulmonary/Chest: She has no wheezes. She has no rhonchi. She has rales.   Abdominal: Abdomen is soft. Bowel sounds are normal.   Musculoskeletal:         General: Normal range of motion.      Cervical back: Normal range of motion and neck supple.     Neurological: She is alert and oriented to person, place, and time. GCS score is 15.  GCS eye subscore is 4. GCS verbal subscore is 5. GCS motor subscore is 6.   Skin: Skin is warm and dry. Capillary refill takes less than 2 seconds.   Psychiatric: She has a normal mood and affect. Her behavior is normal. Judgment and thought content normal.         ED Course   Procedures  Labs Reviewed   COMPREHENSIVE METABOLIC PANEL - Abnormal       Result Value    Sodium 139      Potassium 3.8      Chloride 113 (*)     CO2 18 (*)     Glucose 115 (*)     Blood Urea Nitrogen 8.3      Creatinine 0.93      Calcium 9.1      Protein Total 6.7      Albumin 3.7      Globulin 3.0      Albumin/Globulin Ratio 1.2      Bilirubin Total 0.4      ALP 92      ALT 11      AST 9      eGFR >60      Anion Gap 8.0      BUN/Creatinine Ratio 9     COVID/FLU A&B PCR - Abnormal    Influenza A PCR Not Detected      Influenza B PCR Not Detected      SARS-CoV-2 PCR Detected (*)     Narrative:     The Xpert Xpress SARS-CoV-2/FLU/RSV plus is a rapid, multiplexed real-time PCR test intended for the simultaneous qualitative detection and differentiation of SARS-CoV-2, Influenza A, Influenza B, and respiratory syncytial virus (RSV) viral RNA in either nasopharyngeal swab or nasal swab specimens.         CBC WITH DIFFERENTIAL - Abnormal    WBC 15.54 (*)     RBC 4.41      Hgb 11.7 (*)     Hct 35.9 (*)     MCV 81.4      MCH 26.5 (*)     MCHC 32.6 (*)     RDW 13.2      Platelet 270      MPV 9.1      Neut % 83.5      Lymph % 10.0      Mono % 4.9      Eos % 1.0      Basophil % 0.2      Lymph # 1.56      Neut # 12.97 (*)     Mono # 0.76      Eos # 0.16      Baso # 0.03      IG# 0.06 (*)     IG% 0.4      NRBC% 0.0     ACETAMINOPHEN LEVEL - Normal    Acetaminophen Level 12.0     CBC W/ AUTO DIFFERENTIAL    Narrative:     The following orders were created for panel order CBC Auto Differential.  Procedure                               Abnormality         Status                     ---------                               -----------         ------                      CBC with Differential[[id number]]       Abnormal            Final result                 Please view results for these tests on the individual orders.          Imaging Results              X-Ray Chest 1 View (Final result)  Result time 08/27/24 08:14:45      Final result by Kofi Velez MD (08/27/24 08:14:45)                   Impression:      No acute chest disease is identified.      Electronically signed by: Kofi Velez  Date:    08/27/2024  Time:    08:14               Narrative:    EXAMINATION:  XR CHEST 1 VIEW    CLINICAL HISTORY:  cough;, .    COMPARISON:  May 27, 2019    FINDINGS:  No alveolar consolidation, effusion, or pneumothorax is seen.   The thoracic aorta is normal  cardiac silhouette, central pulmonary vessels and mediastinum are normal in size and are grossly unremarkable.   visualized osseous structures are grossly unremarkable.                                       Medications   lactated ringers bolus 1,000 mL (1,000 mLs Intravenous New Bag 8/27/24 0750)   ondansetron injection 4 mg (4 mg Intravenous Given 8/27/24 0750)   ketorolac injection 30 mg (30 mg Intravenous Given 8/27/24 0750)     Medical Decision Making  Flu COVID pneumonia dehydration Tylenol toxicity    Mild leukocytosis chest x-ray clear vital signs stable is COVID 19 positive, patient resting comfortably feels much better after IV fluids patient will be discharged patient Tylenol level normal with normal LFTs    Problems Addressed:  COVID: complicated acute illness or injury with systemic symptoms that poses a threat to life or bodily functions    Amount and/or Complexity of Data Reviewed  Labs: ordered.  Radiology: ordered.    Risk  Prescription drug management.               ED Course as of 08/27/24 0914   Tue Aug 27, 2024   0750 Chest x-ray without abnormality vital signs stable [FK]      ED Course User Index  [FK] Omid Pang III, MD                           Clinical Impression:  Final  diagnoses:  [U07.1] COVID (Primary)          ED Disposition Condition    Discharge Stable          ED Prescriptions       Medication Sig Dispense Start Date End Date Auth. Provider    ondansetron (ZOFRAN) 4 MG tablet Take 1 tablet (4 mg total) by mouth every 6 (six) hours. 12 tablet 8/27/2024 -- Omid Pang III, MD          Follow-up Information       Follow up With Specialties Details Why Contact Info    Brigida Hawkins MD Family Medicine In 3 days  [address] [facility] [id number] [phone]               Omid Pang III, MD  08/27/24 1768

## 2024-08-27 NOTE — DISCHARGE INSTRUCTIONS
Do not take any Tylenol for the rest of the day the max dose of Tylenol you can take in a day is 4 g which is 2 of the 500 mg pills every 6 hours.

## (undated) DEVICE — GLOVE SIGNATURE ESSNTL LTX 6

## (undated) DEVICE — MANIFOLD 4 PORT

## (undated) DEVICE — SPONGE PATTY SURGICAL .5X3IN

## (undated) DEVICE — SHAMPOO BABY CARE 1.7OZ

## (undated) DEVICE — TRACKER PATIENT NON INVASIVE

## (undated) DEVICE — SUT PROLENE 2-0 SH 36IN BLU

## (undated) DEVICE — SOL NORMAL USPCA 0.9%

## (undated) DEVICE — BLADE TRICUT

## (undated) DEVICE — CONTAINER SPECIMEN OR STER 4OZ

## (undated) DEVICE — BLADE ENT RAD 60 4.0MM

## (undated) DEVICE — SUT PLN GUT 4-0 SC-1SC-1 1

## (undated) DEVICE — BLADE INFERIOR TURBINATE 2MM

## (undated) DEVICE — SOL 9P NACL IRR PIC IL

## (undated) DEVICE — NDL MAGELLAN SAFETY 18G 1.5IN

## (undated) DEVICE — Device

## (undated) DEVICE — GLOVE SIGNATURE MICRO LTX 7.5

## (undated) DEVICE — KIT ANTIFOG W/SPONG & FLUID

## (undated) DEVICE — SUT 2-0 ETHILON 18 FS

## (undated) DEVICE — DRESSING SINUS STENT.

## (undated) DEVICE — GLOVE SIGNATURE MICRO LTX 6.5

## (undated) DEVICE — GOWN POLY REINF BRTH SLV XL

## (undated) DEVICE — KIT SURGICAL TURNOVER

## (undated) DEVICE — SPONGE GAUZE 4X4 12 PLY STRL

## (undated) DEVICE — TUBING MEDI-VAC 20FT .25IN

## (undated) DEVICE — SPLINT BREATHE-EASY NASAL REG

## (undated) DEVICE — TRAY CATH FOL SIL URIMTR 16FR

## (undated) DEVICE — MAGNETIC INSTRUMENT DRAPE

## (undated) DEVICE — GOWN POLY REINF X-LONG 2XL

## (undated) DEVICE — GLOVE SENSICARE PI GRN 6

## (undated) DEVICE — NDL ECLIPSE SAFETY 18GX1-1/2IN

## (undated) DEVICE — GLOVE SENSICARE PI GRN 6.5

## (undated) DEVICE — SUT 5/0 18IN PLAIN FAST AB

## (undated) DEVICE — GLOVE PROTEXIS HYDROGEL SZ7.5

## (undated) DEVICE — DRESSING NASOPORE FD 8CM

## (undated) DEVICE — PACKING NASOPORE NASAL STD 8CM

## (undated) DEVICE — GLOVE SIGNATURE MICRO LTX 7

## (undated) DEVICE — TRACKER ENT INSTRUMENT

## (undated) DEVICE — NDL 27G X 1 1/4

## (undated) DEVICE — STRIP MEDI WND CLSR 1/2X4IN

## (undated) DEVICE — SPONGE LAP 18X18 PREWASHED

## (undated) DEVICE — SPONGE GAUZE 16PLY 4X4

## (undated) DEVICE — GLOVE SENSICARE PI GRN 8

## (undated) DEVICE — CORD BIPOLAR 12 FOOT

## (undated) DEVICE — DRESSING TRANS 2X2 TEGADERM

## (undated) DEVICE — ADHESIVE MASTISOL VIAL 48/BX

## (undated) DEVICE — STAPLER SKIN COUNT 35 W STPL

## (undated) DEVICE — SUT 5/0 27IN CHROMIC GUT B

## (undated) DEVICE — SYR 10CC LUER LOCK

## (undated) DEVICE — STAPLER SKIN ROTATING HEAD

## (undated) DEVICE — GLOVE SENSICARE NEOPRENE 6.5